# Patient Record
Sex: MALE | Race: BLACK OR AFRICAN AMERICAN | NOT HISPANIC OR LATINO | Employment: FULL TIME | URBAN - METROPOLITAN AREA
[De-identification: names, ages, dates, MRNs, and addresses within clinical notes are randomized per-mention and may not be internally consistent; named-entity substitution may affect disease eponyms.]

---

## 2021-03-19 ENCOUNTER — HOSPITAL ENCOUNTER (OUTPATIENT)
Facility: HOSPITAL | Age: 51
Discharge: HOME | End: 2021-03-20
Attending: EMERGENCY MEDICINE | Admitting: INTERNAL MEDICINE
Payer: MEDICAID

## 2021-03-19 DIAGNOSIS — R79.89 TROPONIN I ABOVE REFERENCE RANGE: ICD-10-CM

## 2021-03-19 DIAGNOSIS — R94.31 EKG, ABNORMAL: ICD-10-CM

## 2021-03-19 DIAGNOSIS — R79.89 ELEVATED TROPONIN: ICD-10-CM

## 2021-03-19 DIAGNOSIS — R07.89 ATYPICAL CHEST PAIN: ICD-10-CM

## 2021-03-19 DIAGNOSIS — R07.9 CHEST PAIN: ICD-10-CM

## 2021-03-19 DIAGNOSIS — I24.9 ACUTE CORONARY SYNDROME: Primary | ICD-10-CM

## 2021-03-19 PROBLEM — Z21 ASYMPTOMATIC HIV INFECTION: Status: ACTIVE | Noted: 2021-03-19

## 2021-03-19 PROBLEM — Z01.810 PRE-OPERATIVE CARDIOVASCULAR EXAMINATION, NEW EKG ABNORMALITIES C/W ISCHEMIA: Status: ACTIVE | Noted: 2021-03-19

## 2021-03-19 LAB
ALBUMIN SERPL BCP-MCNC: 3.8 G/DL (ref 3.5–5.2)
ALP SERPL-CCNC: 62 U/L (ref 55–135)
ALT SERPL W/O P-5'-P-CCNC: 16 U/L (ref 10–44)
ANION GAP SERPL CALC-SCNC: 6 MMOL/L (ref 8–16)
AORTIC ROOT ANNULUS: 3.01 CM
APTT BLDCRRT: 25.1 SEC (ref 21–32)
APTT BLDCRRT: 37.7 SEC (ref 21–32)
ASCENDING AORTA: 3.22 CM
AST SERPL-CCNC: 42 U/L (ref 10–40)
AV INDEX (PROSTH): 1.11
AV MEAN GRADIENT: 4 MMHG
AV PEAK GRADIENT: 7 MMHG
AV VALVE AREA: 3.52 CM2
AV VELOCITY RATIO: 0.95
BASOPHILS # BLD AUTO: 0.03 K/UL (ref 0–0.2)
BASOPHILS # BLD AUTO: 0.04 K/UL (ref 0–0.2)
BASOPHILS NFR BLD: 0.4 % (ref 0–1.9)
BASOPHILS NFR BLD: 0.4 % (ref 0–1.9)
BILIRUB SERPL-MCNC: 0.3 MG/DL (ref 0.1–1)
BNP SERPL-MCNC: <10 PG/ML (ref 0–99)
BSA FOR ECHO PROCEDURE: 2.09 M2
BUN SERPL-MCNC: 8 MG/DL (ref 6–20)
CALCIUM SERPL-MCNC: 8.8 MG/DL (ref 8.7–10.5)
CHLORIDE SERPL-SCNC: 111 MMOL/L (ref 95–110)
CO2 SERPL-SCNC: 22 MMOL/L (ref 23–29)
CREAT SERPL-MCNC: 1.1 MG/DL (ref 0.5–1.4)
CTP QC/QA: YES
CV ECHO LV RWT: 0.6 CM
DIFFERENTIAL METHOD: ABNORMAL
DIFFERENTIAL METHOD: ABNORMAL
DOP CALC AO PEAK VEL: 1.31 M/S
DOP CALC AO VTI: 24.18 CM
DOP CALC LVOT AREA: 3.2 CM2
DOP CALC LVOT DIAMETER: 2.01 CM
DOP CALC LVOT PEAK VEL: 1.25 M/S
DOP CALC LVOT STROKE VOLUME: 85.22 CM3
DOP CALC RVOT PEAK VEL: 0.82 M/S
DOP CALC RVOT VTI: 19.38 CM
DOP CALCLVOT PEAK VEL VTI: 26.87 CM
E WAVE DECELERATION TIME: 214.67 MSEC
E/A RATIO: 0.84
E/E' RATIO: 8.1 M/S
ECHO LV POSTERIOR WALL: 1.2 CM (ref 0.6–1.1)
EOSINOPHIL # BLD AUTO: 0.1 K/UL (ref 0–0.5)
EOSINOPHIL # BLD AUTO: 0.2 K/UL (ref 0–0.5)
EOSINOPHIL NFR BLD: 1.4 % (ref 0–8)
EOSINOPHIL NFR BLD: 2 % (ref 0–8)
ERYTHROCYTE [DISTWIDTH] IN BLOOD BY AUTOMATED COUNT: 13 % (ref 11.5–14.5)
ERYTHROCYTE [DISTWIDTH] IN BLOOD BY AUTOMATED COUNT: 13 % (ref 11.5–14.5)
EST. GFR  (AFRICAN AMERICAN): >60 ML/MIN/1.73 M^2
EST. GFR  (NON AFRICAN AMERICAN): >60 ML/MIN/1.73 M^2
FRACTIONAL SHORTENING: 32 % (ref 28–44)
GLUCOSE SERPL-MCNC: 96 MG/DL (ref 70–110)
HCT VFR BLD AUTO: 34.6 % (ref 40–54)
HCT VFR BLD AUTO: 35.9 % (ref 40–54)
HCV AB SERPL QL IA: NEGATIVE
HGB BLD-MCNC: 11.6 G/DL (ref 14–18)
HGB BLD-MCNC: 12.3 G/DL (ref 14–18)
IMM GRANULOCYTES # BLD AUTO: 0.02 K/UL (ref 0–0.04)
IMM GRANULOCYTES # BLD AUTO: 0.03 K/UL (ref 0–0.04)
IMM GRANULOCYTES NFR BLD AUTO: 0.2 % (ref 0–0.5)
IMM GRANULOCYTES NFR BLD AUTO: 0.3 % (ref 0–0.5)
INR PPP: 0.9 (ref 0.8–1.2)
INTERVENTRICULAR SEPTUM: 1.4 CM (ref 0.6–1.1)
IVRT: 91.34 MSEC
LA MAJOR: 4.37 CM
LA MINOR: 3.7 CM
LEFT ATRIUM SIZE: 3.32 CM
LEFT INTERNAL DIMENSION IN SYSTOLE: 2.73 CM (ref 2.1–4)
LEFT VENTRICLE DIASTOLIC VOLUME INDEX: 34.57 ML/M2
LEFT VENTRICLE DIASTOLIC VOLUME: 71.21 ML
LEFT VENTRICLE MASS INDEX: 92 G/M2
LEFT VENTRICLE SYSTOLIC VOLUME INDEX: 13.5 ML/M2
LEFT VENTRICLE SYSTOLIC VOLUME: 27.76 ML
LEFT VENTRICULAR INTERNAL DIMENSION IN DIASTOLE: 4.03 CM (ref 3.5–6)
LEFT VENTRICULAR MASS: 188.62 G
LIPASE SERPL-CCNC: 74 U/L (ref 4–60)
LV LATERAL E/E' RATIO: 7.36 M/S
LV SEPTAL E/E' RATIO: 9 M/S
LYMPHOCYTES # BLD AUTO: 3 K/UL (ref 1–4.8)
LYMPHOCYTES # BLD AUTO: 3.1 K/UL (ref 1–4.8)
LYMPHOCYTES NFR BLD: 33.7 % (ref 18–48)
LYMPHOCYTES NFR BLD: 37 % (ref 18–48)
MCH RBC QN AUTO: 36.6 PG (ref 27–31)
MCH RBC QN AUTO: 36.9 PG (ref 27–31)
MCHC RBC AUTO-ENTMCNC: 33.5 G/DL (ref 32–36)
MCHC RBC AUTO-ENTMCNC: 34.3 G/DL (ref 32–36)
MCV RBC AUTO: 108 FL (ref 82–98)
MCV RBC AUTO: 109 FL (ref 82–98)
MONOCYTES # BLD AUTO: 0.5 K/UL (ref 0.3–1)
MONOCYTES # BLD AUTO: 0.6 K/UL (ref 0.3–1)
MONOCYTES NFR BLD: 6.3 % (ref 4–15)
MONOCYTES NFR BLD: 6.7 % (ref 4–15)
MV PEAK A VEL: 0.97 M/S
MV PEAK E VEL: 0.81 M/S
MV STENOSIS PRESSURE HALF TIME: 62.25 MS
MV VALVE AREA P 1/2 METHOD: 3.53 CM2
NEUTROPHILS # BLD AUTO: 4.4 K/UL (ref 1.8–7.7)
NEUTROPHILS # BLD AUTO: 5.2 K/UL (ref 1.8–7.7)
NEUTROPHILS NFR BLD: 54.1 % (ref 38–73)
NEUTROPHILS NFR BLD: 57.5 % (ref 38–73)
NRBC BLD-RTO: 0 /100 WBC
NRBC BLD-RTO: 0 /100 WBC
PISA MRMAX VEL: 0.05 M/S
PLATELET # BLD AUTO: 286 K/UL (ref 150–350)
PLATELET # BLD AUTO: 304 K/UL (ref 150–350)
PMV BLD AUTO: 9.4 FL (ref 9.2–12.9)
PMV BLD AUTO: 9.5 FL (ref 9.2–12.9)
POCT GLUCOSE: 89 MG/DL (ref 70–110)
POTASSIUM SERPL-SCNC: 5 MMOL/L (ref 3.5–5.1)
PROT SERPL-MCNC: 7.5 G/DL (ref 6–8.4)
PROTHROMBIN TIME: 10.2 SEC (ref 9–12.5)
PV MEAN GRADIENT: 1 MMHG
RA MAJOR: 3.63 CM
RA PRESSURE: 3 MMHG
RBC # BLD AUTO: 3.17 M/UL (ref 4.6–6.2)
RBC # BLD AUTO: 3.33 M/UL (ref 4.6–6.2)
SARS-COV-2 RDRP RESP QL NAA+PROBE: NEGATIVE
SINUS: 3.31 CM
SODIUM SERPL-SCNC: 139 MMOL/L (ref 136–145)
STJ: 3.19 CM
TDI LATERAL: 0.11 M/S
TDI SEPTAL: 0.09 M/S
TDI: 0.1 M/S
TRICUSPID ANNULAR PLANE SYSTOLIC EXCURSION: 1.93 CM
TROPONIN I SERPL DL<=0.01 NG/ML-MCNC: 0.08 NG/ML (ref 0–0.03)
WBC # BLD AUTO: 8.09 K/UL (ref 3.9–12.7)
WBC # BLD AUTO: 9.04 K/UL (ref 3.9–12.7)

## 2021-03-19 PROCEDURE — 96366 THER/PROPH/DIAG IV INF ADDON: CPT

## 2021-03-19 PROCEDURE — 85025 COMPLETE CBC W/AUTO DIFF WBC: CPT | Performed by: EMERGENCY MEDICINE

## 2021-03-19 PROCEDURE — 80053 COMPREHEN METABOLIC PANEL: CPT | Performed by: EMERGENCY MEDICINE

## 2021-03-19 PROCEDURE — 96374 THER/PROPH/DIAG INJ IV PUSH: CPT

## 2021-03-19 PROCEDURE — 63600175 PHARM REV CODE 636 W HCPCS: Performed by: EMERGENCY MEDICINE

## 2021-03-19 PROCEDURE — 25000003 PHARM REV CODE 250: Performed by: EMERGENCY MEDICINE

## 2021-03-19 PROCEDURE — 85610 PROTHROMBIN TIME: CPT | Performed by: EMERGENCY MEDICINE

## 2021-03-19 PROCEDURE — 83690 ASSAY OF LIPASE: CPT | Performed by: EMERGENCY MEDICINE

## 2021-03-19 PROCEDURE — 84484 ASSAY OF TROPONIN QUANT: CPT | Performed by: EMERGENCY MEDICINE

## 2021-03-19 PROCEDURE — 99220 PR INITIAL OBSERVATION CARE,LEVL III: ICD-10-PCS | Mod: 25,,, | Performed by: INTERNAL MEDICINE

## 2021-03-19 PROCEDURE — 96376 TX/PRO/DX INJ SAME DRUG ADON: CPT

## 2021-03-19 PROCEDURE — 96365 THER/PROPH/DIAG IV INF INIT: CPT

## 2021-03-19 PROCEDURE — 96375 TX/PRO/DX INJ NEW DRUG ADDON: CPT | Mod: 59

## 2021-03-19 PROCEDURE — G0378 HOSPITAL OBSERVATION PER HR: HCPCS

## 2021-03-19 PROCEDURE — U0002 COVID-19 LAB TEST NON-CDC: HCPCS | Performed by: EMERGENCY MEDICINE

## 2021-03-19 PROCEDURE — 25000003 PHARM REV CODE 250: Performed by: PHYSICIAN ASSISTANT

## 2021-03-19 PROCEDURE — 93005 ELECTROCARDIOGRAM TRACING: CPT

## 2021-03-19 PROCEDURE — 36415 COLL VENOUS BLD VENIPUNCTURE: CPT | Performed by: EMERGENCY MEDICINE

## 2021-03-19 PROCEDURE — 84484 ASSAY OF TROPONIN QUANT: CPT | Mod: 91 | Performed by: NURSE PRACTITIONER

## 2021-03-19 PROCEDURE — 83880 ASSAY OF NATRIURETIC PEPTIDE: CPT | Performed by: EMERGENCY MEDICINE

## 2021-03-19 PROCEDURE — 99220 PR INITIAL OBSERVATION CARE,LEVL III: CPT | Mod: 25,,, | Performed by: INTERNAL MEDICINE

## 2021-03-19 PROCEDURE — 36415 COLL VENOUS BLD VENIPUNCTURE: CPT | Performed by: NURSE PRACTITIONER

## 2021-03-19 PROCEDURE — 93010 ELECTROCARDIOGRAM REPORT: CPT | Mod: ,,, | Performed by: INTERNAL MEDICINE

## 2021-03-19 PROCEDURE — 86803 HEPATITIS C AB TEST: CPT | Performed by: EMERGENCY MEDICINE

## 2021-03-19 PROCEDURE — 93010 EKG 12-LEAD: ICD-10-PCS | Mod: ,,, | Performed by: INTERNAL MEDICINE

## 2021-03-19 PROCEDURE — 96360 HYDRATION IV INFUSION INIT: CPT | Mod: 59

## 2021-03-19 PROCEDURE — 99291 CRITICAL CARE FIRST HOUR: CPT | Mod: 25

## 2021-03-19 PROCEDURE — 25000003 PHARM REV CODE 250: Performed by: NURSE PRACTITIONER

## 2021-03-19 PROCEDURE — 85730 THROMBOPLASTIN TIME PARTIAL: CPT | Performed by: EMERGENCY MEDICINE

## 2021-03-19 PROCEDURE — 96361 HYDRATE IV INFUSION ADD-ON: CPT

## 2021-03-19 RX ORDER — IBUPROFEN 800 MG/1
800 TABLET ORAL DAILY PRN
COMMUNITY
End: 2022-03-11

## 2021-03-19 RX ORDER — METOPROLOL TARTRATE 25 MG/1
25 TABLET, FILM COATED ORAL
Status: COMPLETED | OUTPATIENT
Start: 2021-03-19 | End: 2021-03-19

## 2021-03-19 RX ORDER — BENZONATATE 100 MG/1
100 CAPSULE ORAL 3 TIMES DAILY PRN
COMMUNITY
Start: 2020-12-28 | End: 2021-03-19 | Stop reason: CLARIF

## 2021-03-19 RX ORDER — EFAVIRENZ, EMTRICITABINE AND TENOFOVIR DISOPROXIL FUMARATE 600; 200; 300 MG/1; MG/1; MG/1
1 TABLET, FILM COATED ORAL DAILY
Status: DISCONTINUED | OUTPATIENT
Start: 2021-03-19 | End: 2021-03-19

## 2021-03-19 RX ORDER — BICTEGRAVIR SODIUM, EMTRICITABINE, AND TENOFOVIR ALAFENAMIDE FUMARATE 50; 200; 25 MG/1; MG/1; MG/1
1 TABLET ORAL DAILY
COMMUNITY

## 2021-03-19 RX ORDER — KETOROLAC TROMETHAMINE 30 MG/ML
15 INJECTION, SOLUTION INTRAMUSCULAR; INTRAVENOUS
Status: COMPLETED | OUTPATIENT
Start: 2021-03-19 | End: 2021-03-19

## 2021-03-19 RX ORDER — ASPIRIN 325 MG
325 TABLET ORAL
Status: COMPLETED | OUTPATIENT
Start: 2021-03-19 | End: 2021-03-19

## 2021-03-19 RX ORDER — METOPROLOL TARTRATE 25 MG/1
25 TABLET, FILM COATED ORAL 2 TIMES DAILY
Status: DISCONTINUED | OUTPATIENT
Start: 2021-03-19 | End: 2021-03-20 | Stop reason: HOSPADM

## 2021-03-19 RX ORDER — EFAVIRENZ, EMTRICITABINE AND TENOFOVIR DISOPROXIL FUMARATE 600; 200; 300 MG/1; MG/1; MG/1
1 TABLET, FILM COATED ORAL
COMMUNITY
End: 2021-03-19

## 2021-03-19 RX ORDER — SODIUM CHLORIDE 9 MG/ML
INJECTION, SOLUTION INTRAVENOUS CONTINUOUS
Status: DISCONTINUED | OUTPATIENT
Start: 2021-03-19 | End: 2021-03-20 | Stop reason: HOSPADM

## 2021-03-19 RX ORDER — ASPIRIN 81 MG/1
81 TABLET ORAL DAILY
Status: DISCONTINUED | OUTPATIENT
Start: 2021-03-19 | End: 2021-03-20 | Stop reason: HOSPADM

## 2021-03-19 RX ORDER — ONDANSETRON 2 MG/ML
4 INJECTION INTRAMUSCULAR; INTRAVENOUS
Status: COMPLETED | OUTPATIENT
Start: 2021-03-19 | End: 2021-03-19

## 2021-03-19 RX ORDER — HEPARIN SODIUM,PORCINE/D5W 25000/250
0-40 INTRAVENOUS SOLUTION INTRAVENOUS CONTINUOUS
Status: DISCONTINUED | OUTPATIENT
Start: 2021-03-19 | End: 2021-03-20 | Stop reason: HOSPADM

## 2021-03-19 RX ADMIN — METOPROLOL TARTRATE 25 MG: 25 TABLET, FILM COATED ORAL at 09:03

## 2021-03-19 RX ADMIN — NITROGLYCERIN 1 INCH: 20 OINTMENT TOPICAL at 02:03

## 2021-03-19 RX ADMIN — SODIUM CHLORIDE: 0.9 INJECTION, SOLUTION INTRAVENOUS at 03:03

## 2021-03-19 RX ADMIN — ONDANSETRON 4 MG: 2 INJECTION INTRAMUSCULAR; INTRAVENOUS at 12:03

## 2021-03-19 RX ADMIN — ASPIRIN 325 MG ORAL TABLET 325 MG: 325 PILL ORAL at 02:03

## 2021-03-19 RX ADMIN — KETOROLAC TROMETHAMINE 15 MG: 30 INJECTION, SOLUTION INTRAMUSCULAR at 12:03

## 2021-03-19 RX ADMIN — METOPROLOL TARTRATE 25 MG: 25 TABLET, FILM COATED ORAL at 02:03

## 2021-03-19 RX ADMIN — HEPARIN SODIUM 12 UNITS/KG/HR: 10000 INJECTION, SOLUTION INTRAVENOUS at 03:03

## 2021-03-20 VITALS
OXYGEN SATURATION: 98 % | BODY MASS INDEX: 28.2 KG/M2 | WEIGHT: 197 LBS | SYSTOLIC BLOOD PRESSURE: 110 MMHG | HEART RATE: 69 BPM | RESPIRATION RATE: 18 BRPM | HEIGHT: 70 IN | TEMPERATURE: 98 F | DIASTOLIC BLOOD PRESSURE: 68 MMHG

## 2021-03-20 PROBLEM — R79.89 ELEVATED TROPONIN: Status: RESOLVED | Noted: 2021-03-19 | Resolved: 2021-03-20

## 2021-03-20 PROBLEM — R07.89 ATYPICAL CHEST PAIN: Status: RESOLVED | Noted: 2021-03-19 | Resolved: 2021-03-20

## 2021-03-20 LAB
ALBUMIN SERPL BCP-MCNC: 3.3 G/DL (ref 3.5–5.2)
ALP SERPL-CCNC: 54 U/L (ref 55–135)
ALT SERPL W/O P-5'-P-CCNC: 11 U/L (ref 10–44)
ANION GAP SERPL CALC-SCNC: 8 MMOL/L (ref 8–16)
APTT BLDCRRT: 47 SEC (ref 21–32)
APTT BLDCRRT: 50.3 SEC (ref 21–32)
AST SERPL-CCNC: 17 U/L (ref 10–40)
BASOPHILS # BLD AUTO: 0.05 K/UL (ref 0–0.2)
BASOPHILS NFR BLD: 0.6 % (ref 0–1.9)
BILIRUB SERPL-MCNC: 0.2 MG/DL (ref 0.1–1)
BUN SERPL-MCNC: 8 MG/DL (ref 6–20)
CALCIUM SERPL-MCNC: 8 MG/DL (ref 8.7–10.5)
CHLORIDE SERPL-SCNC: 112 MMOL/L (ref 95–110)
CHOLEST SERPL-MCNC: 150 MG/DL (ref 120–199)
CHOLEST/HDLC SERPL: 3.6 {RATIO} (ref 2–5)
CO2 SERPL-SCNC: 21 MMOL/L (ref 23–29)
CREAT SERPL-MCNC: 1 MG/DL (ref 0.5–1.4)
DIFFERENTIAL METHOD: ABNORMAL
EOSINOPHIL # BLD AUTO: 0.2 K/UL (ref 0–0.5)
EOSINOPHIL NFR BLD: 2 % (ref 0–8)
ERYTHROCYTE [DISTWIDTH] IN BLOOD BY AUTOMATED COUNT: 12.8 % (ref 11.5–14.5)
EST. GFR  (AFRICAN AMERICAN): >60 ML/MIN/1.73 M^2
EST. GFR  (NON AFRICAN AMERICAN): >60 ML/MIN/1.73 M^2
GLUCOSE SERPL-MCNC: 87 MG/DL (ref 70–110)
HCT VFR BLD AUTO: 33.8 % (ref 40–54)
HDLC SERPL-MCNC: 42 MG/DL (ref 40–75)
HDLC SERPL: 28 % (ref 20–50)
HGB BLD-MCNC: 11.2 G/DL (ref 14–18)
IMM GRANULOCYTES # BLD AUTO: 0.03 K/UL (ref 0–0.04)
IMM GRANULOCYTES NFR BLD AUTO: 0.4 % (ref 0–0.5)
LDLC SERPL CALC-MCNC: 92.8 MG/DL (ref 63–159)
LYMPHOCYTES # BLD AUTO: 3.6 K/UL (ref 1–4.8)
LYMPHOCYTES NFR BLD: 45.8 % (ref 18–48)
MCH RBC QN AUTO: 36.5 PG (ref 27–31)
MCHC RBC AUTO-ENTMCNC: 33.1 G/DL (ref 32–36)
MCV RBC AUTO: 110 FL (ref 82–98)
MONOCYTES # BLD AUTO: 0.5 K/UL (ref 0.3–1)
MONOCYTES NFR BLD: 6.1 % (ref 4–15)
NEUTROPHILS # BLD AUTO: 3.5 K/UL (ref 1.8–7.7)
NEUTROPHILS NFR BLD: 45.1 % (ref 38–73)
NONHDLC SERPL-MCNC: 108 MG/DL
NRBC BLD-RTO: 0 /100 WBC
PLATELET # BLD AUTO: 295 K/UL (ref 150–350)
PLATELET BLD QL SMEAR: ABNORMAL
PMV BLD AUTO: 9.8 FL (ref 9.2–12.9)
POTASSIUM SERPL-SCNC: 3.8 MMOL/L (ref 3.5–5.1)
PROT SERPL-MCNC: 6 G/DL (ref 6–8.4)
RBC # BLD AUTO: 3.07 M/UL (ref 4.6–6.2)
SODIUM SERPL-SCNC: 141 MMOL/L (ref 136–145)
TRIGL SERPL-MCNC: 76 MG/DL (ref 30–150)
TROPONIN I SERPL DL<=0.01 NG/ML-MCNC: 0.06 NG/ML (ref 0–0.03)
WBC # BLD AUTO: 7.84 K/UL (ref 3.9–12.7)

## 2021-03-20 PROCEDURE — 25000003 PHARM REV CODE 250: Performed by: PHYSICIAN ASSISTANT

## 2021-03-20 PROCEDURE — 85730 THROMBOPLASTIN TIME PARTIAL: CPT | Performed by: EMERGENCY MEDICINE

## 2021-03-20 PROCEDURE — 85730 THROMBOPLASTIN TIME PARTIAL: CPT | Mod: 91 | Performed by: INTERNAL MEDICINE

## 2021-03-20 PROCEDURE — 25000003 PHARM REV CODE 250: Performed by: NURSE PRACTITIONER

## 2021-03-20 PROCEDURE — 36415 COLL VENOUS BLD VENIPUNCTURE: CPT | Performed by: INTERNAL MEDICINE

## 2021-03-20 PROCEDURE — 63600175 PHARM REV CODE 636 W HCPCS: Performed by: PHYSICIAN ASSISTANT

## 2021-03-20 PROCEDURE — 80061 LIPID PANEL: CPT | Performed by: NURSE PRACTITIONER

## 2021-03-20 PROCEDURE — 63600175 PHARM REV CODE 636 W HCPCS: Performed by: EMERGENCY MEDICINE

## 2021-03-20 PROCEDURE — 84484 ASSAY OF TROPONIN QUANT: CPT | Performed by: NURSE PRACTITIONER

## 2021-03-20 PROCEDURE — 96376 TX/PRO/DX INJ SAME DRUG ADON: CPT

## 2021-03-20 PROCEDURE — G0378 HOSPITAL OBSERVATION PER HR: HCPCS

## 2021-03-20 PROCEDURE — 36415 COLL VENOUS BLD VENIPUNCTURE: CPT | Performed by: NURSE PRACTITIONER

## 2021-03-20 PROCEDURE — 96366 THER/PROPH/DIAG IV INF ADDON: CPT

## 2021-03-20 PROCEDURE — 85025 COMPLETE CBC W/AUTO DIFF WBC: CPT | Performed by: EMERGENCY MEDICINE

## 2021-03-20 PROCEDURE — 80053 COMPREHEN METABOLIC PANEL: CPT | Performed by: NURSE PRACTITIONER

## 2021-03-20 PROCEDURE — 99225 PR SUBSEQUENT OBSERVATION CARE,LEVEL II: ICD-10-PCS | Mod: ,,, | Performed by: INTERNAL MEDICINE

## 2021-03-20 PROCEDURE — 96361 HYDRATE IV INFUSION ADD-ON: CPT

## 2021-03-20 PROCEDURE — 99225 PR SUBSEQUENT OBSERVATION CARE,LEVEL II: CPT | Mod: ,,, | Performed by: INTERNAL MEDICINE

## 2021-03-20 RX ORDER — REGADENOSON 0.08 MG/ML
0.4 INJECTION, SOLUTION INTRAVENOUS ONCE
Status: COMPLETED | OUTPATIENT
Start: 2021-03-20 | End: 2021-03-20

## 2021-03-20 RX ORDER — ASPIRIN 81 MG/1
81 TABLET ORAL DAILY
Qty: 30 TABLET | Refills: 11 | Status: SHIPPED | OUTPATIENT
Start: 2021-03-21 | End: 2022-03-21

## 2021-03-20 RX ORDER — SIMVASTATIN 5 MG/1
5 TABLET, FILM COATED ORAL NIGHTLY
Qty: 90 TABLET | Refills: 3 | Status: SHIPPED | OUTPATIENT
Start: 2021-03-20 | End: 2021-04-07

## 2021-03-20 RX ORDER — METOPROLOL TARTRATE 25 MG/1
25 TABLET, FILM COATED ORAL 2 TIMES DAILY
Qty: 60 TABLET | Refills: 11 | Status: SHIPPED | OUTPATIENT
Start: 2021-03-20 | End: 2021-04-07

## 2021-03-20 RX ADMIN — NITROGLYCERIN 1 INCH: 20 OINTMENT TOPICAL at 05:03

## 2021-03-20 RX ADMIN — NITROGLYCERIN 1 INCH: 20 OINTMENT TOPICAL at 11:03

## 2021-03-20 RX ADMIN — HEPARIN SODIUM 14 UNITS/KG/HR: 10000 INJECTION, SOLUTION INTRAVENOUS at 09:03

## 2021-03-20 RX ADMIN — SODIUM CHLORIDE: 0.9 INJECTION, SOLUTION INTRAVENOUS at 09:03

## 2021-03-20 RX ADMIN — ASPIRIN 81 MG: 81 TABLET, FILM COATED ORAL at 08:03

## 2021-03-20 RX ADMIN — REGADENOSON 0.4 MG: 0.08 INJECTION, SOLUTION INTRAVENOUS at 03:03

## 2021-03-20 RX ADMIN — METOPROLOL TARTRATE 25 MG: 25 TABLET, FILM COATED ORAL at 08:03

## 2021-03-21 LAB
CV STRESS BASE HR: 53 BPM
DIASTOLIC BLOOD PRESSURE: 65 MMHG
EJECTION FRACTION- HIGH: 65 %
END DIASTOLIC INDEX-HIGH: 158 ML/M2
END DIASTOLIC INDEX-LOW: 94 ML/M2
END SYSTOLIC INDEX-HIGH: 71 ML/M2
END SYSTOLIC INDEX-LOW: 33 ML/M2
NUC REST DIASTOLIC VOLUME INDEX: 117
NUC REST EJECTION FRACTION: 44
NUC REST SYSTOLIC VOLUME INDEX: 66
NUC STRESS DIASTOLIC VOLUME INDEX: 123
NUC STRESS EJECTION FRACTION: 62 %
NUC STRESS SYSTOLIC VOLUME INDEX: 47
OHS CV CPX 85 PERCENT MAX PREDICTED HEART RATE MALE: 144
OHS CV CPX MAX PREDICTED HEART RATE: 169
OHS CV CPX PATIENT IS FEMALE: 0
OHS CV CPX PATIENT IS MALE: 1
OHS CV CPX PEAK DIASTOLIC BLOOD PRESSURE: 74 MMHG
OHS CV CPX PEAK HEAR RATE: 92 BPM
OHS CV CPX PEAK RATE PRESSURE PRODUCT: NORMAL
OHS CV CPX PEAK SYSTOLIC BLOOD PRESSURE: 114 MMHG
OHS CV CPX PERCENT MAX PREDICTED HEART RATE ACHIEVED: 54
OHS CV CPX RATE PRESSURE PRODUCT PRESENTING: 5830
RETIRED EF AND QEF - SEE NOTES: 53 %
SYSTOLIC BLOOD PRESSURE: 110 MMHG

## 2021-03-26 ENCOUNTER — TELEPHONE (OUTPATIENT)
Dept: CARDIOLOGY | Facility: CLINIC | Age: 51
End: 2021-03-26

## 2021-03-29 ENCOUNTER — TELEPHONE (OUTPATIENT)
Dept: CARDIOLOGY | Facility: CLINIC | Age: 51
End: 2021-03-29

## 2021-04-07 ENCOUNTER — OFFICE VISIT (OUTPATIENT)
Dept: CARDIOLOGY | Facility: CLINIC | Age: 51
End: 2021-04-07
Payer: MEDICAID

## 2021-04-07 VITALS
HEART RATE: 88 BPM | WEIGHT: 198.19 LBS | DIASTOLIC BLOOD PRESSURE: 70 MMHG | OXYGEN SATURATION: 95 % | BODY MASS INDEX: 29.36 KG/M2 | HEIGHT: 69 IN | SYSTOLIC BLOOD PRESSURE: 124 MMHG

## 2021-04-07 DIAGNOSIS — R07.89 ATYPICAL CHEST PAIN: Primary | ICD-10-CM

## 2021-04-07 PROCEDURE — 99999 PR PBB SHADOW E&M-EST. PATIENT-LVL III: CPT | Mod: PBBFAC,,, | Performed by: NURSE PRACTITIONER

## 2021-04-07 PROCEDURE — 99213 PR OFFICE/OUTPT VISIT, EST, LEVL III, 20-29 MIN: ICD-10-PCS | Mod: S$PBB,,, | Performed by: NURSE PRACTITIONER

## 2021-04-07 PROCEDURE — 99999 PR PBB SHADOW E&M-EST. PATIENT-LVL III: ICD-10-PCS | Mod: PBBFAC,,, | Performed by: NURSE PRACTITIONER

## 2021-04-07 PROCEDURE — 99213 OFFICE O/P EST LOW 20 MIN: CPT | Mod: PBBFAC | Performed by: NURSE PRACTITIONER

## 2021-04-07 PROCEDURE — 99213 OFFICE O/P EST LOW 20 MIN: CPT | Mod: S$PBB,,, | Performed by: NURSE PRACTITIONER

## 2021-06-30 ENCOUNTER — OFFICE VISIT (OUTPATIENT)
Dept: PRIMARY CARE CLINIC | Facility: CLINIC | Age: 51
End: 2021-06-30
Payer: MEDICAID

## 2021-06-30 DIAGNOSIS — Z20.2 EXPOSURE TO STD: Primary | ICD-10-CM

## 2021-06-30 DIAGNOSIS — Z20.2 EXPOSURE TO SYPHILIS: ICD-10-CM

## 2021-06-30 PROCEDURE — 99203 OFFICE O/P NEW LOW 30 MIN: CPT | Mod: S$PBB,,, | Performed by: NURSE PRACTITIONER

## 2021-06-30 PROCEDURE — 99203 PR OFFICE/OUTPT VISIT, NEW, LEVL III, 30-44 MIN: ICD-10-PCS | Mod: S$PBB,,, | Performed by: NURSE PRACTITIONER

## 2021-07-06 DIAGNOSIS — Z20.2 EXPOSURE TO SYPHILIS: Primary | ICD-10-CM

## 2021-07-06 PROCEDURE — 99211 OFF/OP EST MAY X REQ PHY/QHP: CPT | Mod: ,,, | Performed by: NURSE PRACTITIONER

## 2021-07-06 PROCEDURE — 99211 PR OFFICE/OUTPT VISIT, EST, LEVL I: ICD-10-PCS | Mod: ,,, | Performed by: NURSE PRACTITIONER

## 2021-07-13 DIAGNOSIS — Z20.2 EXPOSURE TO SYPHILIS: Primary | ICD-10-CM

## 2021-07-13 PROCEDURE — 99211 PR OFFICE/OUTPT VISIT, EST, LEVL I: ICD-10-PCS | Mod: ,,, | Performed by: NURSE PRACTITIONER

## 2021-07-13 PROCEDURE — 99211 OFF/OP EST MAY X REQ PHY/QHP: CPT | Mod: ,,, | Performed by: NURSE PRACTITIONER

## 2021-08-19 ENCOUNTER — OCCUPATIONAL HEALTH (OUTPATIENT)
Dept: URGENT CARE | Facility: CLINIC | Age: 51
End: 2021-08-19

## 2021-08-19 DIAGNOSIS — R05.9 COUGH: ICD-10-CM

## 2021-08-19 DIAGNOSIS — R05.9 COUGH: Primary | ICD-10-CM

## 2021-08-19 DIAGNOSIS — R51.9 NONINTRACTABLE HEADACHE, UNSPECIFIED CHRONICITY PATTERN, UNSPECIFIED HEADACHE TYPE: ICD-10-CM

## 2021-08-19 LAB
CTP QC/QA: YES
SARS-COV-2 RDRP RESP QL NAA+PROBE: NEGATIVE

## 2021-08-19 PROCEDURE — U0002 COVID-19 LAB TEST NON-CDC: HCPCS | Mod: QW,S$GLB,, | Performed by: PREVENTIVE MEDICINE

## 2021-08-19 PROCEDURE — U0002: ICD-10-PCS | Mod: QW,S$GLB,, | Performed by: PREVENTIVE MEDICINE

## 2022-03-01 ENCOUNTER — IMMUNIZATION (OUTPATIENT)
Dept: PRIMARY CARE CLINIC | Facility: CLINIC | Age: 52
End: 2022-03-01
Payer: MEDICAID

## 2022-03-01 DIAGNOSIS — Z23 NEED FOR VACCINATION: Primary | ICD-10-CM

## 2022-03-01 PROCEDURE — 91300 COVID-19, MRNA, LNP-S, PF, 30 MCG/0.3 ML DOSE VACCINE: CPT | Mod: PBBFAC | Performed by: FAMILY MEDICINE

## 2022-03-02 DIAGNOSIS — R11.0 NAUSEA: Primary | ICD-10-CM

## 2022-03-02 DIAGNOSIS — T50.B95A MYALGIA AFTER COVID-19 VACCINATION: ICD-10-CM

## 2022-03-02 DIAGNOSIS — M79.10 MYALGIA: ICD-10-CM

## 2022-03-02 DIAGNOSIS — M79.10 MYALGIA AFTER COVID-19 VACCINATION: ICD-10-CM

## 2022-03-02 RX ORDER — ONDANSETRON 4 MG/1
4 TABLET, ORALLY DISINTEGRATING ORAL
Status: DISCONTINUED | OUTPATIENT
Start: 2022-03-02 | End: 2022-08-22

## 2022-03-02 RX ORDER — IBUPROFEN 200 MG
600 TABLET ORAL
Status: DISCONTINUED | OUTPATIENT
Start: 2022-03-02 | End: 2022-08-22

## 2022-03-11 DIAGNOSIS — M54.9 BACK PAIN, UNSPECIFIED BACK LOCATION, UNSPECIFIED BACK PAIN LATERALITY, UNSPECIFIED CHRONICITY: Primary | ICD-10-CM

## 2022-03-11 RX ORDER — TIZANIDINE 4 MG/1
4 TABLET ORAL EVERY 8 HOURS PRN
Qty: 30 TABLET | Refills: 1 | Status: SHIPPED | OUTPATIENT
Start: 2022-03-11 | End: 2022-03-21

## 2022-03-11 RX ORDER — DICLOFENAC SODIUM 75 MG/1
75 TABLET, DELAYED RELEASE ORAL 2 TIMES DAILY PRN
Qty: 30 TABLET | Refills: 2 | Status: SHIPPED | OUTPATIENT
Start: 2022-03-11

## 2022-03-11 RX ORDER — KETOROLAC TROMETHAMINE 30 MG/ML
60 INJECTION, SOLUTION INTRAMUSCULAR; INTRAVENOUS
Status: DISCONTINUED | OUTPATIENT
Start: 2022-03-11 | End: 2022-08-22

## 2022-05-10 ENCOUNTER — HOSPITAL ENCOUNTER (OUTPATIENT)
Dept: RADIOLOGY | Facility: HOSPITAL | Age: 52
Discharge: HOME | End: 2022-05-10
Attending: NURSE PRACTITIONER
Payer: MEDICAID

## 2022-05-10 ENCOUNTER — CLINICAL SUPPORT (OUTPATIENT)
Dept: PRIMARY CARE CLINIC | Facility: CLINIC | Age: 52
End: 2022-05-10
Payer: MEDICAID

## 2022-05-10 DIAGNOSIS — R10.9 FLANK PAIN: ICD-10-CM

## 2022-05-10 DIAGNOSIS — R10.9 FLANK PAIN: Primary | ICD-10-CM

## 2022-05-10 DIAGNOSIS — N20.0 KIDNEY STONE: Primary | ICD-10-CM

## 2022-05-10 LAB
BILIRUB SERPL-MCNC: NEGATIVE MG/DL
BLOOD URINE, POC: ABNORMAL
CLARITY, POC UA: CLEAR
COLOR, POC UA: YELLOW
GLUCOSE UR QL STRIP: NEGATIVE
KETONES UR QL STRIP: NEGATIVE
LEUKOCYTE ESTERASE URINE, POC: NEGATIVE
NITRITE, POC UA: NEGATIVE
PH, POC UA: 5
PROTEIN, POC: 30
SPECIFIC GRAVITY, POC UA: 1.03
UROBILINOGEN, POC UA: NORMAL

## 2022-05-10 PROCEDURE — 74018 RADEX ABDOMEN 1 VIEW: CPT | Mod: 26,,, | Performed by: RADIOLOGY

## 2022-05-10 PROCEDURE — 99999 PR PBB SHADOW E&M-EST. PATIENT-LVL II: CPT | Mod: PBBFAC,,,

## 2022-05-10 PROCEDURE — 99999 PR PBB SHADOW E&M-EST. PATIENT-LVL II: ICD-10-PCS | Mod: PBBFAC,,,

## 2022-05-10 PROCEDURE — 74018 XR ABDOMEN AP 1 VIEW: ICD-10-PCS | Mod: 26,,, | Performed by: RADIOLOGY

## 2022-05-10 PROCEDURE — 96372 THER/PROPH/DIAG INJ SC/IM: CPT | Mod: PBBFAC,PN

## 2022-05-10 PROCEDURE — 99212 OFFICE O/P EST SF 10 MIN: CPT | Mod: PBBFAC,PN

## 2022-05-10 PROCEDURE — 81002 URINALYSIS NONAUTO W/O SCOPE: CPT | Mod: PBBFAC,PN | Performed by: NURSE PRACTITIONER

## 2022-05-10 PROCEDURE — 74018 RADEX ABDOMEN 1 VIEW: CPT | Mod: TC,PN

## 2022-05-10 RX ORDER — TAMSULOSIN HYDROCHLORIDE 0.4 MG/1
0.4 CAPSULE ORAL DAILY
Qty: 7 CAPSULE | Refills: 0 | Status: SHIPPED | OUTPATIENT
Start: 2022-05-10 | End: 2022-05-17

## 2022-05-10 RX ORDER — KETOROLAC TROMETHAMINE 30 MG/ML
60 INJECTION, SOLUTION INTRAMUSCULAR; INTRAVENOUS
Status: COMPLETED | OUTPATIENT
Start: 2022-05-10 | End: 2022-05-10

## 2022-05-10 RX ORDER — PROMETHAZINE HYDROCHLORIDE 25 MG/1
25 TABLET ORAL EVERY 6 HOURS PRN
Qty: 21 TABLET | Refills: 0 | Status: SHIPPED | OUTPATIENT
Start: 2022-05-10 | End: 2022-05-17

## 2022-05-10 RX ADMIN — KETOROLAC TROMETHAMINE 60 MG: 60 INJECTION, SOLUTION INTRAMUSCULAR at 10:05

## 2022-05-10 NOTE — PROGRESS NOTES
Patient in for POCT urine dip stick, see result notes. Provider informed. Patient will follow up with provider.

## 2022-05-11 ENCOUNTER — HOSPITAL ENCOUNTER (OUTPATIENT)
Dept: RADIOLOGY | Facility: HOSPITAL | Age: 52
Discharge: HOME | End: 2022-05-11
Attending: NURSE PRACTITIONER
Payer: MEDICAID

## 2022-05-11 ENCOUNTER — OFFICE VISIT (OUTPATIENT)
Dept: PRIMARY CARE CLINIC | Facility: CLINIC | Age: 52
End: 2022-05-11
Payer: MEDICAID

## 2022-05-11 VITALS
SYSTOLIC BLOOD PRESSURE: 135 MMHG | WEIGHT: 200 LBS | DIASTOLIC BLOOD PRESSURE: 83 MMHG | BODY MASS INDEX: 29.53 KG/M2 | OXYGEN SATURATION: 96 % | TEMPERATURE: 98 F | HEART RATE: 68 BPM

## 2022-05-11 DIAGNOSIS — R31.9 HEMATURIA, UNSPECIFIED TYPE: ICD-10-CM

## 2022-05-11 DIAGNOSIS — R10.9 ABDOMINAL PAIN, UNSPECIFIED ABDOMINAL LOCATION: ICD-10-CM

## 2022-05-11 DIAGNOSIS — R10.9 FLANK PAIN: Primary | ICD-10-CM

## 2022-05-11 DIAGNOSIS — M54.9 BACK PAIN, UNSPECIFIED BACK LOCATION, UNSPECIFIED BACK PAIN LATERALITY, UNSPECIFIED CHRONICITY: ICD-10-CM

## 2022-05-11 PROCEDURE — 3075F PR MOST RECENT SYSTOLIC BLOOD PRESS GE 130-139MM HG: ICD-10-PCS | Mod: CPTII,,, | Performed by: NURSE PRACTITIONER

## 2022-05-11 PROCEDURE — 74176 CT ABD & PELVIS W/O CONTRAST: CPT | Mod: 26,,, | Performed by: RADIOLOGY

## 2022-05-11 PROCEDURE — 99213 OFFICE O/P EST LOW 20 MIN: CPT | Mod: PBBFAC,25,PN | Performed by: NURSE PRACTITIONER

## 2022-05-11 PROCEDURE — 3008F BODY MASS INDEX DOCD: CPT | Mod: CPTII,,, | Performed by: NURSE PRACTITIONER

## 2022-05-11 PROCEDURE — 99999 PR PBB SHADOW E&M-EST. PATIENT-LVL III: CPT | Mod: PBBFAC,,, | Performed by: NURSE PRACTITIONER

## 2022-05-11 PROCEDURE — 74176 CT RENAL STONE STUDY ABD PELVIS WO: ICD-10-PCS | Mod: 26,,, | Performed by: RADIOLOGY

## 2022-05-11 PROCEDURE — 3008F PR BODY MASS INDEX (BMI) DOCUMENTED: ICD-10-PCS | Mod: CPTII,,, | Performed by: NURSE PRACTITIONER

## 2022-05-11 PROCEDURE — 74176 CT ABD & PELVIS W/O CONTRAST: CPT | Mod: TC

## 2022-05-11 PROCEDURE — 1159F MED LIST DOCD IN RCRD: CPT | Mod: CPTII,,, | Performed by: NURSE PRACTITIONER

## 2022-05-11 PROCEDURE — 99213 OFFICE O/P EST LOW 20 MIN: CPT | Mod: S$PBB,,, | Performed by: NURSE PRACTITIONER

## 2022-05-11 PROCEDURE — 3075F SYST BP GE 130 - 139MM HG: CPT | Mod: CPTII,,, | Performed by: NURSE PRACTITIONER

## 2022-05-11 PROCEDURE — 99213 PR OFFICE/OUTPT VISIT, EST, LEVL III, 20-29 MIN: ICD-10-PCS | Mod: S$PBB,,, | Performed by: NURSE PRACTITIONER

## 2022-05-11 PROCEDURE — 3079F PR MOST RECENT DIASTOLIC BLOOD PRESSURE 80-89 MM HG: ICD-10-PCS | Mod: CPTII,,, | Performed by: NURSE PRACTITIONER

## 2022-05-11 PROCEDURE — 1159F PR MEDICATION LIST DOCUMENTED IN MEDICAL RECORD: ICD-10-PCS | Mod: CPTII,,, | Performed by: NURSE PRACTITIONER

## 2022-05-11 PROCEDURE — 3079F DIAST BP 80-89 MM HG: CPT | Mod: CPTII,,, | Performed by: NURSE PRACTITIONER

## 2022-05-11 PROCEDURE — 96372 THER/PROPH/DIAG INJ SC/IM: CPT | Mod: PBBFAC,PN

## 2022-05-11 PROCEDURE — 99999 PR PBB SHADOW E&M-EST. PATIENT-LVL III: ICD-10-PCS | Mod: PBBFAC,,, | Performed by: NURSE PRACTITIONER

## 2022-05-11 RX ORDER — KETOROLAC TROMETHAMINE 30 MG/ML
60 INJECTION, SOLUTION INTRAMUSCULAR; INTRAVENOUS
Status: COMPLETED | OUTPATIENT
Start: 2022-05-11 | End: 2022-05-11

## 2022-05-11 RX ADMIN — KETOROLAC TROMETHAMINE 60 MG: 60 INJECTION, SOLUTION INTRAMUSCULAR at 10:05

## 2022-05-11 NOTE — PROGRESS NOTES
Subjective:       Patient ID: Chandra Stone is a 52 y.o. male.    Chief Complaint: Flank Pain Suspected Kidney Stone    History of Present Illness:   Chandra Stone 52 y.o. male presents today with reports of flank pain that has been present for about 3 days and progressively becoming worse. POCT u/a + for moderate blood. Patient denies any other problems or concerns at this time.     Past Medical History:   Diagnosis Date    HIV (human immunodeficiency virus infection)      Family History   Problem Relation Age of Onset    Hypertension Mother     Diabetes Mother     Diabetes Brother     Hypertension Brother      Social History     Socioeconomic History    Marital status: Single   Tobacco Use    Smoking status: Former Smoker     Years: 10.00    Smokeless tobacco: Former User    Tobacco comment: quit over 5 years ago    Substance and Sexual Activity    Alcohol use: Yes     Comment: 3/week     Drug use: Never     Outpatient Encounter Medications as of 5/11/2022   Medication Sig Dispense Refill    dneziqoht-xqxjblqv-sewlozj ala (BIKTARVY) -25 mg per tablet Take 1 tablet by mouth once daily.      diclofenac (VOLTAREN) 75 MG EC tablet Take 1 tablet (75 mg total) by mouth 2 (two) times daily as needed (back pain). 30 tablet 2    promethazine (PHENERGAN) 25 MG tablet Take 1 tablet (25 mg total) by mouth every 6 (six) hours as needed for Nausea. 21 tablet 0    tamsulosin (FLOMAX) 0.4 mg Cap Take 1 capsule (0.4 mg total) by mouth once daily. for 7 days 7 capsule 0    aspirin (ECOTRIN) 81 MG EC tablet Take 1 tablet (81 mg total) by mouth once daily. 30 tablet 11    ciprofloxacin HCl (CIPRO) 500 MG tablet Take 1 tablet (500 mg total) by mouth 2 (two) times daily. for 7 days 14 tablet 0    HYDROcodone-acetaminophen (NORCO) 7.5-325 mg per tablet Take 1 tablet by mouth every 8 (eight) hours as needed for Pain. 14 tablet 0    tiZANidine (ZANAFLEX) 4 MG tablet Take 1 tablet (4 mg total) by mouth  every 6 (six) hours as needed (back spasm). 30 tablet 1     Facility-Administered Encounter Medications as of 5/11/2022   Medication Dose Route Frequency Provider Last Rate Last Admin    ibuprofen tablet 600 mg  600 mg Oral 1 time in Clinic/HOD Anne-Marie Rondon MD        ketorolac injection 60 mg  60 mg Intramuscular 1 time in Clinic/HOD Georges Block DNP        [COMPLETED] ketorolac injection 60 mg  60 mg Intramuscular 1 time in Clinic[facility] Georges Block DNP   60 mg at 05/10/22 1006    [COMPLETED] ketorolac injection 60 mg  60 mg Intramuscular 1 time in Clinic/HOD Georges Block DNP   60 mg at 05/11/22 1050    ondansetron disintegrating tablet 4 mg  4 mg Oral 1 time in Clinic/HOD Anne-Marie Rondon MD           Review of Systems   Constitutional: Negative for activity change, appetite change, fatigue and fever.   HENT: Negative for congestion, ear discharge, ear pain, mouth sores, nosebleeds, sore throat and tinnitus.    Eyes: Negative for discharge, redness and itching.   Respiratory: Negative for apnea, cough and shortness of breath.    Cardiovascular: Negative for chest pain and leg swelling.   Gastrointestinal: Negative for abdominal distention, abdominal pain, blood in stool and constipation.   Endocrine: Negative for polydipsia, polyphagia and polyuria.   Genitourinary: Positive for flank pain (bilateral). Negative for difficulty urinating, frequency and hematuria.   Musculoskeletal: Negative for back pain, gait problem, neck pain and neck stiffness.   Skin: Negative for rash and wound.   Allergic/Immunologic: Negative for environmental allergies, food allergies and immunocompromised state.   Neurological: Negative for dizziness, seizures, syncope, numbness and headaches.   Hematological: Negative for adenopathy. Does not bruise/bleed easily.   Psychiatric/Behavioral: Negative for agitation, confusion, hallucinations, self-injury and suicidal ideas.       Objective:       /83 (BP Location: Right arm, Patient Position: Sitting, BP Method: Large (Automatic))   Pulse 68   Temp 98.1 °F (36.7 °C) (Temporal)   Wt 90.7 kg (200 lb)   SpO2 96%   BMI 29.53 kg/m²   Physical Exam  Vitals and nursing note reviewed.   Constitutional:       Appearance: He is well-developed.   HENT:      Head: Normocephalic.      Right Ear: External ear normal.      Left Ear: External ear normal.      Nose: Nose normal.   Eyes:      Conjunctiva/sclera: Conjunctivae normal.      Pupils: Pupils are equal, round, and reactive to light.   Neck:      Vascular: No JVD.   Cardiovascular:      Rate and Rhythm: Normal rate and regular rhythm.      Heart sounds: Normal heart sounds.   Pulmonary:      Effort: Pulmonary effort is normal.      Breath sounds: Normal breath sounds. No wheezing.   Abdominal:      General: Bowel sounds are normal.      Palpations: Abdomen is soft.      Tenderness: There is no abdominal tenderness. There is right CVA tenderness and left CVA tenderness.   Musculoskeletal:      Cervical back: Normal range of motion and neck supple.   Lymphadenopathy:      Cervical: No cervical adenopathy.   Skin:     General: Skin is warm and dry.   Neurological:      Mental Status: He is alert and oriented to person, place, and time.   Psychiatric:         Behavior: Behavior normal.         Thought Content: Thought content normal.         Judgment: Judgment normal.         Results for orders placed or performed in visit on 05/10/22   POCT URINE DIPSTICK WITHOUT MICROSCOPE   Result Value Ref Range    Color, UA Yellow     pH, UA 5     WBC, UA Negative     Nitrite, UA Negative     Protein, POC 30     Glucose, UA Negative     Ketones, UA Negative     Urobilinogen, UA Normal     Bilirubin, POC Negative     Blood, UA Large     Clarity, UA Clear     Spec Grav UA 1.030      Assessment:       1. Flank pain    2. Hematuria, unspecified type    3. Abdominal pain, unspecified abdominal location    4. Back pain,  unspecified back location, unspecified back pain laterality, unspecified chronicity        Plan:   Chandra was seen today for hip pain and other misc.    Diagnoses and all orders for this visit:    Flank pain  -     Basic Metabolic Panel; Future    Hematuria, unspecified type  -     Basic Metabolic Panel; Future    Abdominal pain, unspecified abdominal location  -     Cancel: CT Renal Stone Study ABD Pelvis WO; Future  -     CT Renal Stone Study ABD Pelvis WO; Future    Back pain, unspecified back location, unspecified back pain laterality, unspecified chronicity    Other orders  -     ketorolac injection 60 mg  -     tiZANidine (ZANAFLEX) 4 MG tablet; Take 1 tablet (4 mg total) by mouth every 6 (six) hours as needed (back spasm).  -     HYDROcodone-acetaminophen (NORCO) 7.5-325 mg per tablet; Take 1 tablet by mouth every 8 (eight) hours as needed for Pain.  -     ciprofloxacin HCl (CIPRO) 500 MG tablet; Take 1 tablet (500 mg total) by mouth 2 (two) times daily. for 7 days             [facility]   [address] [place] [address] [address]  [place] [address]  Office [phone]  Fax [phone]

## 2022-05-12 RX ORDER — TIZANIDINE 4 MG/1
4 TABLET ORAL EVERY 6 HOURS PRN
Qty: 30 TABLET | Refills: 1 | Status: SHIPPED | OUTPATIENT
Start: 2022-05-12 | End: 2022-05-22

## 2022-05-12 RX ORDER — CIPROFLOXACIN 500 MG/1
500 TABLET ORAL 2 TIMES DAILY
Qty: 14 TABLET | Refills: 0 | Status: SHIPPED | OUTPATIENT
Start: 2022-05-12 | End: 2022-05-19

## 2022-05-12 RX ORDER — HYDROCODONE BITARTRATE AND ACETAMINOPHEN 7.5; 325 MG/1; MG/1
1 TABLET ORAL EVERY 8 HOURS PRN
Qty: 14 TABLET | Refills: 0 | Status: SHIPPED | OUTPATIENT
Start: 2022-05-12 | End: 2022-05-19

## 2022-08-22 ENCOUNTER — HOSPITAL ENCOUNTER (OUTPATIENT)
Dept: RADIOLOGY | Facility: HOSPITAL | Age: 52
Discharge: HOME | End: 2022-08-22
Attending: FAMILY MEDICINE
Payer: MEDICAID

## 2022-08-22 ENCOUNTER — OFFICE VISIT (OUTPATIENT)
Dept: PRIMARY CARE CLINIC | Facility: CLINIC | Age: 52
End: 2022-08-22
Payer: MEDICAID

## 2022-08-22 VITALS
WEIGHT: 207 LBS | DIASTOLIC BLOOD PRESSURE: 82 MMHG | BODY MASS INDEX: 31.37 KG/M2 | TEMPERATURE: 97 F | HEIGHT: 68 IN | HEART RATE: 76 BPM | SYSTOLIC BLOOD PRESSURE: 136 MMHG

## 2022-08-22 DIAGNOSIS — M75.01 ADHESIVE CAPSULITIS OF RIGHT SHOULDER: ICD-10-CM

## 2022-08-22 DIAGNOSIS — Z12.11 COLON CANCER SCREENING: ICD-10-CM

## 2022-08-22 DIAGNOSIS — R52 ACUTE PAIN: ICD-10-CM

## 2022-08-22 DIAGNOSIS — M75.31 CALCIFIC TENDINITIS OF RIGHT SHOULDER: Primary | ICD-10-CM

## 2022-08-22 PROCEDURE — 20610: ICD-10-PCS | Mod: S$PBB,RT,, | Performed by: FAMILY MEDICINE

## 2022-08-22 PROCEDURE — 3075F PR MOST RECENT SYSTOLIC BLOOD PRESS GE 130-139MM HG: ICD-10-PCS | Mod: CPTII,,, | Performed by: FAMILY MEDICINE

## 2022-08-22 PROCEDURE — 99214 OFFICE O/P EST MOD 30 MIN: CPT | Mod: S$PBB,25,, | Performed by: FAMILY MEDICINE

## 2022-08-22 PROCEDURE — 20610 DRAIN/INJ JOINT/BURSA W/O US: CPT | Mod: PBBFAC,PN | Performed by: FAMILY MEDICINE

## 2022-08-22 PROCEDURE — 1159F PR MEDICATION LIST DOCUMENTED IN MEDICAL RECORD: ICD-10-PCS | Mod: CPTII,,, | Performed by: FAMILY MEDICINE

## 2022-08-22 PROCEDURE — 99999 PR PBB SHADOW E&M-EST. PATIENT-LVL V: CPT | Mod: PBBFAC,,, | Performed by: FAMILY MEDICINE

## 2022-08-22 PROCEDURE — 73030 X-RAY EXAM OF SHOULDER: CPT | Mod: 26,RT,, | Performed by: RADIOLOGY

## 2022-08-22 PROCEDURE — 3008F BODY MASS INDEX DOCD: CPT | Mod: CPTII,,, | Performed by: FAMILY MEDICINE

## 2022-08-22 PROCEDURE — 1160F PR REVIEW ALL MEDS BY PRESCRIBER/CLIN PHARMACIST DOCUMENTED: ICD-10-PCS | Mod: CPTII,,, | Performed by: FAMILY MEDICINE

## 2022-08-22 PROCEDURE — 3079F DIAST BP 80-89 MM HG: CPT | Mod: CPTII,,, | Performed by: FAMILY MEDICINE

## 2022-08-22 PROCEDURE — 3075F SYST BP GE 130 - 139MM HG: CPT | Mod: CPTII,,, | Performed by: FAMILY MEDICINE

## 2022-08-22 PROCEDURE — 73030 X-RAY EXAM OF SHOULDER: CPT | Mod: TC,PN,RT

## 2022-08-22 PROCEDURE — 99215 OFFICE O/P EST HI 40 MIN: CPT | Mod: PBBFAC,PN,25 | Performed by: FAMILY MEDICINE

## 2022-08-22 PROCEDURE — 3079F PR MOST RECENT DIASTOLIC BLOOD PRESSURE 80-89 MM HG: ICD-10-PCS | Mod: CPTII,,, | Performed by: FAMILY MEDICINE

## 2022-08-22 PROCEDURE — 99214 PR OFFICE/OUTPT VISIT, EST, LEVL IV, 30-39 MIN: ICD-10-PCS | Mod: S$PBB,25,, | Performed by: FAMILY MEDICINE

## 2022-08-22 PROCEDURE — 1159F MED LIST DOCD IN RCRD: CPT | Mod: CPTII,,, | Performed by: FAMILY MEDICINE

## 2022-08-22 PROCEDURE — 3008F PR BODY MASS INDEX (BMI) DOCUMENTED: ICD-10-PCS | Mod: CPTII,,, | Performed by: FAMILY MEDICINE

## 2022-08-22 PROCEDURE — 1160F RVW MEDS BY RX/DR IN RCRD: CPT | Mod: CPTII,,, | Performed by: FAMILY MEDICINE

## 2022-08-22 PROCEDURE — 73030 XR SHOULDER COMPLETE 2 OR MORE VIEWS RIGHT: ICD-10-PCS | Mod: 26,RT,, | Performed by: RADIOLOGY

## 2022-08-22 PROCEDURE — 99999 PR PBB SHADOW E&M-EST. PATIENT-LVL V: ICD-10-PCS | Mod: PBBFAC,,, | Performed by: FAMILY MEDICINE

## 2022-08-22 RX ORDER — HYDROCODONE BITARTRATE AND ACETAMINOPHEN 7.5; 325 MG/1; MG/1
1 TABLET ORAL EVERY 6 HOURS PRN
Qty: 12 TABLET | Refills: 0 | Status: SHIPPED | OUTPATIENT
Start: 2022-08-22 | End: 2022-08-26

## 2022-08-22 RX ORDER — LIDOCAINE HYDROCHLORIDE 10 MG/ML
4 INJECTION INFILTRATION; PERINEURAL
Status: DISCONTINUED | OUTPATIENT
Start: 2022-08-22 | End: 2022-08-23 | Stop reason: HOSPADM

## 2022-08-22 RX ORDER — TRIAMCINOLONE ACETONIDE 40 MG/ML
40 INJECTION, SUSPENSION INTRA-ARTICULAR; INTRAMUSCULAR
Status: DISCONTINUED | OUTPATIENT
Start: 2022-08-22 | End: 2022-08-23 | Stop reason: HOSPADM

## 2022-08-22 RX ADMIN — LIDOCAINE HYDROCHLORIDE 4 ML: 10 INJECTION, SOLUTION INFILTRATION; PERINEURAL at 10:08

## 2022-08-22 RX ADMIN — TRIAMCINOLONE ACETONIDE 40 MG: 40 INJECTION, SUSPENSION INTRA-ARTICULAR; INTRAMUSCULAR at 10:08

## 2022-08-22 NOTE — PROGRESS NOTES
Subjective:       Patient ID: Chandra Stone is a 52 y.o. male.    Chief Complaint: Other (Shoulder pain)      HPI   History of Present Illness:   Chandra Stone 52 y.o. male presents today with     New   Location: right shoulder, top  Onset: 5 days  Provocation:none  Quality: sharp throbbing ache with dec rom  Radiation: none  Severe  Aggravating: any movt,  30 degree limit on lateral elevation  Alleviating: resting  Treatment so far: none      Past Medical History:   Diagnosis Date    HIV (human immunodeficiency virus infection)      Family History   Problem Relation Age of Onset    Hypertension Mother     Diabetes Mother     Diabetes Brother     Hypertension Brother      Social History     Socioeconomic History    Marital status: Single   Tobacco Use    Smoking status: Former Smoker     Years: 10.00    Smokeless tobacco: Former User    Tobacco comment: quit over 5 years ago    Substance and Sexual Activity    Alcohol use: Yes     Comment: 3/week     Drug use: Never     Outpatient Encounter Medications as of 8/22/2022   Medication Sig Dispense Refill    yddpzadyi-vqwtdqfz-ryrkndv ala (BIKTARVY) -25 mg per tablet Take 1 tablet by mouth once daily.      diclofenac (VOLTAREN) 75 MG EC tablet Take 1 tablet (75 mg total) by mouth 2 (two) times daily as needed (back pain). 30 tablet 2    aspirin (ECOTRIN) 81 MG EC tablet Take 1 tablet (81 mg total) by mouth once daily. 30 tablet 11    HYDROcodone-acetaminophen (NORCO) 7.5-325 mg per tablet Take 1 tablet by mouth every 6 (six) hours as needed for Pain. 12 tablet 0    tamsulosin (FLOMAX) 0.4 mg Cap Take 1 capsule (0.4 mg total) by mouth once daily. for 7 days 7 capsule 0     Facility-Administered Encounter Medications as of 8/22/2022   Medication Dose Route Frequency Provider Last Rate Last Admin    ibuprofen tablet 600 mg  600 mg Oral 1 time in Clinic/HOD Anne-Marie Rondon MD        ketorolac injection 60 mg  60 mg Intramuscular 1 time in  "Clinic/HOD Georges Block, IRWIN        LIDOcaine HCL 10 mg/ml (1%) injection 4 mL  4 mL   Anne-Marie Rondon MD   4 mL at 08/22/22 1040    ondansetron disintegrating tablet 4 mg  4 mg Oral 1 time in Clinic/HOD Anne-Marie Rondon MD        triamcinolone acetonide injection 40 mg  40 mg Intra-articular  Anne-Marie Rondon MD   40 mg at 08/22/22 1040       Review of Systems    Review of Systems      A complete 10 point ROS was completed and are positive as per above HPI.    Otherwise negative for fever, diplopia, chest pain, shortness of breath, vomiting, blood in urine, joint pain, skin rash, seizures and unusual bleeding.     Objective:           /82 (BP Location: Left arm, Patient Position: Sitting, BP Method: Medium (Manual))   Pulse 76   Temp 97 °F (36.1 °C) (Temporal)   Ht 5' 8" (1.727 m)   Wt 93.9 kg (207 lb)   BMI 31.47 kg/m²   Physical Exam  Musculoskeletal:      Right shoulder: Tenderness present. Decreased range of motion.        Arms:       Comments: Only able to passively lift of the right arm 30 degrees laterally due to discomfort, unable to tolerate exam.           Results for orders placed or performed in visit on 05/11/22   Basic Metabolic Panel   Result Value Ref Range    Sodium 138 136 - 145 mmol/L    Potassium 3.6 3.5 - 5.1 mmol/L    Chloride 105 95 - 110 mmol/L    CO2 21 (L) 23 - 29 mmol/L    Glucose 142 (H) 70 - 110 mg/dL    BUN 11 6 - 20 mg/dL    Creatinine 1.3 0.5 - 1.4 mg/dL    Calcium 9.0 8.7 - 10.5 mg/dL    Anion Gap 12 8 - 16 mmol/L    eGFR if African American >60 >60 mL/min/1.73 m^2    eGFR if non African American >60 >60 mL/min/1.73 m^2     Assessment:       1. Calcific tendinitis of right shoulder    2. Adhesive capsulitis of right shoulder    3. Colon cancer screening    4. Acute pain        Plan:   Calcific tendinitis of right shoulder  Comments:  per xry  Orders:  -     Ambulatory referral/consult to Physical/Occupational Therapy; Future; Expected date: " 08/29/2022    Adhesive capsulitis of right shoulder  Comments:  presumed before xray  Orders:  -     X-ray Shoulder 2 or More Views Right; Future; Expected date: 08/22/2022  -     Large Joint Aspiration/Injection: shoulder injection, right: R glenohumeral  -     triamcinolone acetonide injection 40 mg  -     LIDOcaine HCL 10 mg/ml (1%) injection 4 mL    Colon cancer screening  -     Cologuard Screening (Multitarget Stool DNA); Future; Expected date: 08/22/2022    Acute pain  -     HYDROcodone-acetaminophen (NORCO) 7.5-325 mg per tablet; Take 1 tablet by mouth every 6 (six) hours as needed for Pain.  Dispense: 12 tablet; Refill: 0        Initially suspected to be adhesive capsulitis based on presentation and sxs. Xray confirmed calcific tendinitis which could progress to the initial dx.  Injection was given at the time to relieve pain and trial of PT and if he cannot tolerate it, ortho referral.      Treatment options and alternatives were discussed with the patient. Patient was given ample time to ask questions. All questions were answered. Voices understanding and acceptance of this advice. Will call back if any further questions or concerns.  Anne-Marie Rondon MD

## 2022-08-23 NOTE — PROCEDURES
Large Joint Aspiration/Injection: shoulder injection, right: R glenohumeral    Date/Time: 8/22/2022 10:40 AM  Performed by: Anne-Marie Rondon MD  Authorized by: Anne-Marie Rondon MD     Consent Done?:  Yes (Written)  Indications:  Pain  Site marked: the procedure site was marked    Timeout: prior to procedure the correct patient, procedure, and site was verified    Prep: patient was prepped and draped in usual sterile fashion      Local anesthesia used?: Yes    Local anesthetic:  Lidocaine spray    Details:  Needle Size:  25 G  Ultrasonic Guidance for needle placement?: No    Approach:  Posterior  Location:  Shoulder  Site:  R glenohumeral  Medications:  40 mg triamcinolone acetonide 40 mg/mL; 4 mL LIDOcaine HCL 10 mg/ml (1%) 10 mg/mL (1 %)  Patient tolerance:  Patient tolerated the procedure well with no immediate complications     Rest, Ice, and Compression for 24 hours.  Watch for any redness, swelling, or pain as potential signs of infections.  Return to clinic if any fever or suspicion of infection  Gradual return to normal activity.

## 2022-09-19 ENCOUNTER — CLINICAL SUPPORT (OUTPATIENT)
Dept: REHABILITATION | Facility: HOSPITAL | Age: 52
End: 2022-09-19
Attending: FAMILY MEDICINE
Payer: MEDICAID

## 2022-09-19 DIAGNOSIS — R53.1 DECREASED STRENGTH: ICD-10-CM

## 2022-09-19 DIAGNOSIS — M25.60 DECREASED RANGE OF MOTION: ICD-10-CM

## 2022-09-19 DIAGNOSIS — R68.89 DECREASED FUNCTIONAL ACTIVITY TOLERANCE: ICD-10-CM

## 2022-09-19 DIAGNOSIS — M75.31 CALCIFIC TENDINITIS OF RIGHT SHOULDER: ICD-10-CM

## 2022-09-19 PROCEDURE — 97140 MANUAL THERAPY 1/> REGIONS: CPT

## 2022-09-19 PROCEDURE — 97162 PT EVAL MOD COMPLEX 30 MIN: CPT

## 2022-09-19 NOTE — PLAN OF CARE
[facility]   Physical Therapy Initial Evaluation     Date: 9/19/2022   Name: Chandra HOOD Stone  [facility] Number: [id number]    Therapy Diagnosis:   Encounter Diagnoses   Name Primary?    Calcific tendinitis of right shoulder     Decreased range of motion     Decreased strength     Decreased functional activity tolerance      Physician: Anne-Marie Rondon MD    Physician Orders: PT Eval and Treat   Medical Diagnosis from Referral: Calcific tendinitis of right shoulder  Evaluation Date: 9/19/2022  Authorization Period Expiration: 8/22/23  Plan of Care Expiration: 11/19/22  Progress Note Due: 10/19/22  Visit # / Visits authorized: 1/1   FOTO: 1/3     Precautions: blood and bodily fluid, Immunosuppression, and HIV +    Time In: 4:00 pm  Time Out: 4:45 pm  Total Appointment Time (timed & untimed codes): 40 minutes    SUBJECTIVE   Date of onset: ~2 and half weeks ago    History of current condition - Chandra reports: Woke up and thought that he slept wrong right shoulder hurting, as day went by still hurting and by Friday and through weekend hurt worse and felt like was having decreased ROM, 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   Followed with Dr Rondon and showed her the decreased ROM and told about pain. She thought was maybe frozen shoulder and did x-ray and could see calcification.  Got an injection and almost immediately after had full ROM and decreased pain.  Still not painful since injection.    Falls: none    Imaging, x-rays: Impression: No fracture or dislocation right shoulder.  Findings suggestive of calcific tendinosis involving the likely supraspinatus tendinous insertion.    Prior Therapy: yes, for something else  Social History: patient  lives alone  Occupation: Phlebotomist  Prior Level of Function: unrestricted use of shoulder   Current Level of Function: currently - no difficulty with shoulder since shot. Prior to shot was unable to lift arm away from body    Pain:  Current 0/10, worst  9.5/10, best 0/10   Location: right shoulder    Description: Deep and Sharp pain with movement  Aggravating Factors: moving shoulder   Easing Factors: rest, injection    Patients goals: Resolution, & for pain not to come back.     Medical History:   Past Medical History:   Diagnosis Date    HIV (human immunodeficiency virus infection)        Surgical History:   Chandra Stone  has a past surgical history that includes Circumcision.    Medications:   Chandra has a current medication list which includes the following prescription(s): aspirin, biktarvy, diclofenac, and tamsulosin.    Allergies:   Review of patient's allergies indicates:  No Known Allergies       OBJECTIVE     CMS Impairment/Limitation/Restriction for FOTO shoulder Survey    Therapist reviewed FOTO scores for Chandra Stone on 9/19/2022.   FOTO documents entered into [employer] - see Media section.    Limitation Score: 42%         Posture: Pt noted to present with forward head/rounded shoulder posture, decreased thoracic Kyphosis, decreased lumbar lordosis.    B scapula elevated and protracted, right  scapula greater than left.  poor scapulo/humeral rhythm on right .      ROM:  Shoulder  AROM/PROM Right Left Pain/Dysfunction with Movement   flexion  140 / pain at end range  180/WNL    extension  50 / NT  48/WNL    abduction  120 / 110  170/WNL Pain right    Internal rotation  T/L junction / WFL  L3/WNL Uncomfortable on right     ER  T4 / 60  T2/WNL Painful on right      Cervical spine cleared.    Strength:  U/E MMT Right Left Pain/Dysfunction with Movement   Cervical SB 4/5 4/5    Upper Trapezial 4/5 4/5    Shoulder Flexion 4/5 4/5    Shoulder Abduction 4/5 4/5    Elbow Flexion (C5)  4/5 4/5    Elbow Extension (C7) 4/5 4/5    Shoulder ER  @ 0* Abduction 4/5 4/5    Shoulder IR  @ 0* Abduction 4/5 4/5    Wrist extension (C6) 4+/5 4+/5     Strength  4+/5 4+/5    Intrinsic strength (4th/5th fingers) 3+/5 4/5    Serratus Anterior 4-/5 4-/5     Supraspinatus NT pain 4-/5 Pain with test position   Mid Traps NT pain 3+/5 Pain with test position   Low Traps 3/5 3+/5      Sensation: Intact to light touch bilateral  UE's, all dermatomes.    Reflexes:  Tricep: defer  Brachialis: defer    Special Test:  Cabrera +  Neers  +     Empty Can + pain only Drop Arm -     Apprehension -  Sulcus  -    AC crossover NT  Lift off  + pain/strength      Joint Mobility: slightly decreased    Upper Limb Tension Test:   defer      Palpation:  Patient tender with increased tone over right upper trapezial muscle, levator scapula, latissimus dorsi, teres minor, posterior deltoid, infraspinatus, subscapularis, medial/lateral scapular muscle's.     TREATMENT     Total Treatment time (time-based codes) separate from Evaluation: 10 minutes      Chandra received the treatments listed below:      therapeutic exercises to develop strength, endurance, posture, and core stabilization for 2 minutes including:    Intervention 9/19/2022  Parameters   Home exercise program  x                            manual therapy techniques: Myofacial release and Soft tissue Mobilization were applied to the: right shoulder for 8 minutes, including:    Manual Intervention 9/19/2022     Soft Tissue Mobilization x right upper trapezius, levator scapulae , periscapular musculature, latissimus dorsi , infraspinatus , teres major and minor , subscapularis , and subscapular releases.   Joint Mobilizations     Mobilization with movement x Scapular tilting        Functional Dry Needling             PATIENT EDUCATION AND HOME EXERCISES     Education provided:   - home exercise program     Written Home Exercises Provided: yes. Exercises were reviewed and Chandra was able to demonstrate them prior to the end of the session.  Chandra demonstrated good  understanding of the education provided. See EMR under Patient Instructions for exercises provided during therapy sessions.    ASSESSMENT     Chandra is a 52 y.o. male referred  to outpatient Physical Therapy with a medical diagnosis of Calcific tendinitis of right shoulder. The patient presents with signs and symptoms consistent with diagnosis along with impairments which include weakness, impaired endurance, decreased upper extremity function, abnormal tone, decreased ROM, and impaired joint extensibility.  These impairments are limiting patient's ability to:   Place a 25 lb. box on a shelf overhead   Participate in rigorous contact sports  Reach an overhead shelf   Reach a shelf that is at shoulder height    Pt prognosis is Good, if patient is consistent and compliant with PT and HEP .   Pt will benefit from skilled outpatient Physical Therapy to address the deficits stated above and in the chart below, provide pt/family education, and to maximize pt's level of independence.     Plan of care discussed with patient: Yes  Pt's spiritual, cultural and educational needs considered and patient is agreeable to the plan of care and goals as stated below:     Anticipated Barriers for therapy: Back pain    Medical Necessity is demonstrated by the following  History  Co-morbidities and personal factors that may impact the plan of care Co-morbidities:   HIV/AIDS, back pain    Personal Factors:   coping style     moderate   Examination  Body Structures and Functions, activity limitations and participation restrictions that may impact the plan of care Body Regions:   upper extremities  trunk    Body Systems:    ROM  strength  joint mobility, muscle tone, muscle length    Participation Restrictions:   See current level of function listed above     Activity limitations:   Learning and applying knowledge  no deficits    General Tasks and Commands  no deficits    Communication  no deficits    Mobility  lifting and carrying objects  Reaching behind and overhead     Self care  no deficits    Domestic Life  Reaching anything at shoulder height or above    Interactions/Relationships  no deficits    Life  Areas  no deficits    Community and Social Life  community life  recreation and leisure         moderate   Clinical Presentation evolving clinical presentation with changing clinical characteristics moderate   Decision Making/ Complexity Score: moderate     Goals:  Short Term Goals: In 4 weeks   1.Patient  to be educated on home exercise program.  2.Patient to increase shoulder AROM to equal left , in order to improve function of involved UE.  3.Patient to increase strength by 1/2 grade, in order to improve endurance with activity.  4.Patient to report pain at 0/10 at worst, in order to improve QOL.  5.Patient to improve score on the FOTO, in order to improve QOL.   6. Patient  to be educated on postural and body mechanics awareness.    Long Term Goals: In 8 weeks  1. Patient to improve score on the FOTO to 31% or less, in order to improve QOL.  2. Patient to demonstrate increase in UE strength to 4+/5, in order to improve endurance with activity.  3. Patient to perform daily activities including:   Carry a heavy object (over 10 lbs) - No difficulty  Pull a medium weight object (5-10 lbs.) from under a bed - No difficulty  Lower a lightweight object (1-5 lb.) from the top shelf of a closet - No difficulty  Place a can of soup (1 lb.) on a shelf overhead - No difficulty  Push open a heavy door - No difficulty  Carry a shopping bag or briefcase - No difficulty  Move a heavy skillet (eg, cast iron skillet) from one stove burner to another - No difficulty  Reach and pull the string that controls a light or fan - No difficulty  Adjusting the back of your collar - No difficulty  Throw a ball underhand - No difficulty      PLAN     Plan of care Certification: 9/19/2022 to 11/19/22.    Outpatient Physical Therapy 1 times weekly for 8 weeks to include the following interventions: Electrical Stimulation PRN, Manual Therapy, Moist Heat/ Ice, Neuromuscular Re-ed, Patient Education, Self Care, Therapeutic Activities, and  Therapeutic Exercise.     Inge Corea, PT      I CERTIFY THE NEED FOR THESE SERVICES FURNISHED UNDER THIS PLAN OF TREATMENT AND WHILE UNDER MY CARE   Physician's comments:     Physician's Signature: ___________________________________________________

## 2022-09-23 PROBLEM — R68.89 DECREASED FUNCTIONAL ACTIVITY TOLERANCE: Status: ACTIVE | Noted: 2022-09-23

## 2022-09-23 PROBLEM — R53.1 DECREASED STRENGTH: Status: ACTIVE | Noted: 2022-09-23

## 2022-09-23 PROBLEM — M25.60 DECREASED RANGE OF MOTION: Status: ACTIVE | Noted: 2022-09-23

## 2022-09-26 ENCOUNTER — DOCUMENTATION ONLY (OUTPATIENT)
Dept: REHABILITATION | Facility: HOSPITAL | Age: 52
End: 2022-09-26
Payer: MEDICAID

## 2022-09-26 ENCOUNTER — CLINICAL SUPPORT (OUTPATIENT)
Dept: REHABILITATION | Facility: HOSPITAL | Age: 52
End: 2022-09-26
Payer: MEDICAID

## 2022-09-26 DIAGNOSIS — M25.60 DECREASED RANGE OF MOTION: Primary | ICD-10-CM

## 2022-09-26 DIAGNOSIS — R53.1 DECREASED STRENGTH: ICD-10-CM

## 2022-09-26 DIAGNOSIS — R68.89 DECREASED FUNCTIONAL ACTIVITY TOLERANCE: ICD-10-CM

## 2022-09-26 PROCEDURE — 97110 THERAPEUTIC EXERCISES: CPT | Mod: CQ

## 2022-09-26 NOTE — PROGRESS NOTES
PT/PTA met face to face to discuss pt's treatment plan and progress towards established goals. Pt will be seen by a physical therapist minimally every 6th visit or every 30 days.      Rip Hammonds PTA

## 2022-09-26 NOTE — PROGRESS NOTES
[facility]   Physical Therapy Treatment Note     Name: Chandra HOOD Stone  Clinic Number: [id number]    Therapy Diagnosis:   Encounter Diagnoses   Name Primary?    Decreased range of motion Yes    Decreased strength     Decreased functional activity tolerance      Physician: Anne-Marie Rondon MD    Visit Date: 9/26/2022    Physician Orders: PT Eval and Treat   Medical Diagnosis from Referral: Calcific tendinitis of right shoulder  Evaluation Date: 9/19/2022  Authorization Period Expiration: 8/22/23  Plan of Care Expiration: 11/19/22  Progress Note Due: 10/19/22  Visit # / Visits authorized: 1/20 (+ evaluation)  FOTO: 1/3      Precautions: blood and bodily fluid, Immunosuppression, and HIV +    PTA Visit #: 1/5     Time In: 0830  Time Out: 0915  Total Billable Time: 40 minutes Billing reflects [place] medicaid guidelines, billing all therapy as therapeutic-exercise     SUBJECTIVE     Patient reports: no pain since injections but realizes that the injection relief will be temporary and that he needs to have more of a permanent fix. Before injection, patient reports that he had no range of motion and was unable to do anything with his right upper extremity but since, he has no limitations    He was compliant with home exercise program.    Response to previous treatment: felt fine after the evaluation    Functional change: no limitations since his injection    Pain: 0/10     Location: right shoulder (no pain today)    OBJECTIVE     Objective Measures updated at progress report unless specified.       TREATMENT     Chandra received the treatments listed below:     THERAPEUTIC EXERCISES to develop strength, endurance, ROM, flexibility, posture, and core stabilization for (40) minutes including:    Intervention Performed Today    UBE  x 3 minutes forward / backward    Scapula retraction x 2 x 10 prone   Shoulder extension x 2 x 10 prone   rows x 2 x 10 prone   Codman's  x 2 minutes each forward,  lateral, and counter clockwise and clockwise       Bilateral shoulder External rotation  x 2 x 10    Serratus anterior  x 3 x 8 bilateral supine   Sitting with good posture       Plan for Next Visit:        PATIENT EDUCATION AND HOME EXERCISES     Home Exercises Provided and Patient Education Provided     Education provided: (during session) minutes  PURPOSE: Patient educated on the impairments noted above and the effects of physical therapy intervention to improve overall condition and QOL.   EXERCISE: Patient was educated on all the above exercise prior/during/after for proper posture, positioning, and execution for safe performance with home exercise program.   STRENGTH: Patient educated on the importance of improved core and extremity strength in order to improve alignment of the spine and extremities with static positions and dynamic movement.     Written Home Exercises Provided: yes.  Exercises were reviewed and Chandra was able to demonstrate them prior to the end of the session.  Chandra demonstrated good  understanding of the education provided. See EMR under Patient Instructions for exercises provided during therapy sessions.      ASSESSMENT     All though patient reports his exercises are going well at home, he required verbal cues while using his home exercise program pictures to perform the exercises. He is noted to have decreased muscle strength and poor motor planning with supine serratus anterior exercise but with minimal improvement as exercise progressed. Suspect that he will improve with his exercises quickly.       Chandra is progressing well towards his goals.   Pt prognosis is Good.     Pt will continue to benefit from skilled outpatient physical therapy to address the deficits listed in the problem list box on initial evaluation, provide pt/family education and to maximize pt's level of independence in the home and community environment.     Pt's spiritual, cultural and educational needs considered  and pt agreeable to plan of care and goals.     Anticipated Barriers for therapy: Back pain    Goals:  Short Term Goals: In 4 weeks   1.Patient  to be educated on home exercise program.  2.Patient to increase shoulder AROM to equal left , in order to improve function of involved UE.  3.Patient to increase strength by 1/2 grade, in order to improve endurance with activity.  4.Patient to report pain at 0/10 at worst, in order to improve QOL.  5.Patient to improve score on the FOTO, in order to improve QOL.   6. Patient  to be educated on postural and body mechanics awareness.     Long Term Goals: In 8 weeks  1. Patient to improve score on the FOTO to 31% or less, in order to improve QOL.  2. Patient to demonstrate increase in UE strength to 4+/5, in order to improve endurance with activity.  3. Patient to perform daily activities including:              Carry a heavy object (over 10 lbs) - No difficulty  Pull a medium weight object (5-10 lbs.) from under a bed - No difficulty  Lower a lightweight object (1-5 lb.) from the top shelf of a closet - No difficulty  Place a can of soup (1 lb.) on a shelf overhead - No difficulty  Push open a heavy door - No difficulty  Carry a shopping bag or briefcase - No difficulty  Move a heavy skillet (eg, cast iron skillet) from one stove burner to another - No difficulty  Reach and pull the string that controls a light or fan - No difficulty  Adjusting the back of your collar - No difficulty  Throw a ball underhand - No difficulty       PLAN     Continue Plan of Care (POC) and progress per patient tolerance. See treatment section for details on planned progressions next session.      Rip Hammonds, PTA

## 2022-10-03 ENCOUNTER — CLINICAL SUPPORT (OUTPATIENT)
Dept: REHABILITATION | Facility: HOSPITAL | Age: 52
End: 2022-10-03
Payer: MEDICAID

## 2022-10-03 DIAGNOSIS — R53.1 DECREASED STRENGTH: ICD-10-CM

## 2022-10-03 DIAGNOSIS — M25.60 DECREASED RANGE OF MOTION: Primary | ICD-10-CM

## 2022-10-03 DIAGNOSIS — R68.89 DECREASED FUNCTIONAL ACTIVITY TOLERANCE: ICD-10-CM

## 2022-10-03 PROCEDURE — 97140 MANUAL THERAPY 1/> REGIONS: CPT

## 2022-10-03 PROCEDURE — 97110 THERAPEUTIC EXERCISES: CPT

## 2022-10-03 NOTE — PROGRESS NOTES
[facility]   Physical Therapy Treatment Note     Name: Chandra HOOD [facility] Number: [id number]    Therapy Diagnosis:   Encounter Diagnoses   Name Primary?    Decreased range of motion Yes    Decreased strength     Decreased functional activity tolerance        Physician: Anne-Marie Rondon MD    Visit Date: 10/3/2022    Physician Orders: PT Eval and Treat   Medical Diagnosis from Referral: Calcific tendinitis of right shoulder  Evaluation Date: 9/19/2022  Authorization Period Expiration: 8/22/23  Plan of Care Expiration: 11/19/22  Progress Note Due: 10/19/22  Visit # / Visits authorized: 2/20 (+ 1)  FOTO: 1/3   PTA Visit #: 0/5      Precautions: blood and bodily fluid, Immunosuppression, and HIV+      Time In: 11:10 am  Time Out:11:55 am  Total Billable Time: 40 minutes Billing reflects [place] medicaid guidelines, billing all therapy as therapeutic-exercise     SUBJECTIVE     Patient reports: no pain at start of treatment session, soreness post treatment session.  Will monitor and let us know how he feels.    He was compliant with home exercise program.    Response to previous treatment: felt fine after the evaluation    Functional change: no limitations since his injection    Pain: 0/10     Location: right shoulder (no pain today)    OBJECTIVE     Objective Measures updated at progress report unless specified.       TREATMENT     Chandra received the treatments listed below:     THERAPEUTIC EXERCISES to develop strength, endurance, ROM, flexibility, posture, and core stabilization for (30) minutes including:    Intervention Performed Today    UBE  x 6 minutes forward    Scapula retraction  2 x 10 prone   Shoulder extension x 2 x 10 prone   rows x 2 x 10 prone   Prone W's x 10x   1/2 foam roll series x  x  x  x  x 3 min pectoral stretch  Swimmers 10x  Hugs 10x  [place] 10x  Serratus punch 10x   Codman's   2 minutes each forward, lateral, and counter clockwise and clockwise        Bilateral shoulder External rotation  x 2 x 10 red band   Serratus anterior   3 x 8 bilateral supine   Sitting with good posture       Plan for Next Visit:        Chandra received the following manual therapy techniques: Joint mobilizations, Myofacial release, and Soft tissue Mobilization were applied to the: right upper quadrant for 10 minutes, including:    Manual Intervention 10/3/2022     Soft Tissue Mobilization x right upper trapezius, levator scapulae , periscapular musculature, rhomboids , latissimus dorsi , pectorals, teres major and minor , subscapularis , and deltoid   Joint Mobilizations     Mobilization with movement x Subscapularis release        Functional Dry Needling            PATIENT EDUCATION AND HOME EXERCISES     Home Exercises Provided and Patient Education Provided     Education provided: (during session) minutes  PURPOSE: Patient educated on the impairments noted above and the effects of physical therapy intervention to improve overall condition and QOL.   EXERCISE: Patient was educated on all the above exercise prior/during/after for proper posture, positioning, and execution for safe performance with home exercise program.   STRENGTH: Patient educated on the importance of improved core and extremity strength in order to improve alignment of the spine and extremities with static positions and dynamic movement.     Written Home Exercises Provided: patient was instructed to continue with prior home exercise program.  Exercises were reviewed and Chandra was able to demonstrate them prior to the end of the session.  Chandra demonstrated good  understanding of the education provided. See EMR under Patient Instructions for exercises provided during therapy sessions.      ASSESSMENT     Patient reports improved symptoms with manual treatment but some muscle soreness with therapeutic exercise today. Patient was encouraged to participate with home exercise program.    Chandra is progressing well towards  his goals.   Pt prognosis is Good.     Pt will continue to benefit from skilled outpatient physical therapy to address the deficits listed in the problem list box on initial evaluation, provide pt/family education and to maximize pt's level of independence in the home and community environment.     Pt's spiritual, cultural and educational needs considered and pt agreeable to plan of care and goals.     Anticipated Barriers for therapy: Back pain    Goals:  Reviewed: 10/3/2022    Short Term Goals: In 4 weeks   1.Patient  to be educated on home exercise program.  2.Patient to increase shoulder AROM to equal left , in order to improve function of involved UE.  3.Patient to increase strength by 1/2 grade, in order to improve endurance with activity.  4.Patient to report pain at 0/10 at worst, in order to improve QOL.  5.Patient to improve score on the FOTO, in order to improve QOL.   6. Patient  to be educated on postural and body mechanics awareness.     Long Term Goals: In 8 weeks  1. Patient to improve score on the FOTO to 31% or less, in order to improve QOL.  2. Patient to demonstrate increase in UE strength to 4+/5, in order to improve endurance with activity.  3. Patient to perform daily activities including:              Carry a heavy object (over 10 lbs) - No difficulty  Pull a medium weight object (5-10 lbs.) from under a bed - No difficulty  Lower a lightweight object (1-5 lb.) from the top shelf of a closet - No difficulty  Place a can of soup (1 lb.) on a shelf overhead - No difficulty  Push open a heavy door - No difficulty  Carry a shopping bag or briefcase - No difficulty  Move a heavy skillet (eg, cast iron skillet) from one stove burner to another - No difficulty  Reach and pull the string that controls a light or fan - No difficulty  Adjusting the back of your collar - No difficulty  Throw a ball underhand - No difficulty       PLAN     Plan of care Certification: 9/19/2022 to 11/19/22.     Outpatient  Physical Therapy 1 times weekly for 8 weeks to include the following interventions: Electrical Stimulation PRN, Manual Therapy, Moist Heat/ Ice, Neuromuscular Re-ed, Patient Education, Self Care, Therapeutic Activities, and Therapeutic Exercise.    Monitor response to today's treatment and progress with Physical Therapy plan of care as indicated.    Inge Corea, PT

## 2022-10-07 DIAGNOSIS — J40 BRONCHITIS: Primary | ICD-10-CM

## 2022-10-07 RX ORDER — AZITHROMYCIN 250 MG/1
TABLET, FILM COATED ORAL
Qty: 6 TABLET | Refills: 0 | Status: SHIPPED | OUTPATIENT
Start: 2022-10-07 | End: 2022-10-12

## 2022-10-07 RX ORDER — BENZONATATE 200 MG/1
200 CAPSULE ORAL 3 TIMES DAILY PRN
Qty: 30 CAPSULE | Refills: 0 | Status: SHIPPED | OUTPATIENT
Start: 2022-10-07 | End: 2022-10-17

## 2022-10-11 ENCOUNTER — CLINICAL SUPPORT (OUTPATIENT)
Dept: REHABILITATION | Facility: HOSPITAL | Age: 52
End: 2022-10-11
Payer: MEDICAID

## 2022-10-11 DIAGNOSIS — R68.89 DECREASED FUNCTIONAL ACTIVITY TOLERANCE: ICD-10-CM

## 2022-10-11 DIAGNOSIS — M25.60 DECREASED RANGE OF MOTION: Primary | ICD-10-CM

## 2022-10-11 DIAGNOSIS — R53.1 DECREASED STRENGTH: ICD-10-CM

## 2022-10-11 PROCEDURE — 97110 THERAPEUTIC EXERCISES: CPT

## 2022-10-11 NOTE — PROGRESS NOTES
"  [facility]   Physical Therapy Treatment Note     Name: Chandra HOOD Stone  Clinic Number: [id number]    Therapy Diagnosis:   Encounter Diagnoses   Name Primary?    Decreased range of motion Yes    Decreased strength     Decreased functional activity tolerance          Physician: Anne-Marie Rondon MD    Visit Date: 10/11/2022    Physician Orders: PT Eval and Treat   Medical Diagnosis from Referral: Calcific tendinitis of right shoulder  Evaluation Date: 9/19/2022  Authorization Period Expiration: 8/22/23  Plan of Care Expiration: 11/19/22  Progress Note Due: 10/19/22  Visit # / Visits authorized: 3/20 (+ 1)  FOTO: 1/3   PTA Visit #: 0/5      Precautions: blood and bodily fluid, Immunosuppression, and HIV+      Time In: 8:15 am  Time Out:9:00 am  Total Billable Time: 38 minutes   Billing reflects [place] medicaid guidelines, billing all therapy as therapeutic-exercise     SUBJECTIVE     Patient reports: no pain, did well after last treatment session. Reports some pain with arm in "wrong position" at times, he is worried that cortisone shot is wearing off.  Improved ROM and decreased pain post treatment.  During treatment session after wall push ups patient reports episode of lightheadedness, better after water and resting and able to continue with treatment session.    He was compliant with home exercise program.    Response to previous treatment: felt fine after the evaluation    Functional change: no limitations since his injection    Pain: 0/10     Location: right shoulder (no pain today)    OBJECTIVE     Objective Measures updated at progress report unless specified.       TREATMENT     Chandra received the treatments listed below:     THERAPEUTIC EXERCISES to develop strength, endurance, ROM, flexibility, posture, and core stabilization for (30) minutes including:    Intervention Performed Today    UBE  x 3'/3' minutes forward/backwards    Scapula retraction x 2 x 10  standing; Green " band   Shoulder extension x 2 x 10 prone   rows x 2 x 10 prone, #3   Prone W's x 10x/ 2 sets   1/2 foam roll series x  x  x  x  x 3 min pectoral stretch  Swimmers 10x  Hugs 10x  [place] 10x  Serratus punch 10x   Codman's   2 minutes each forward, lateral, and counter clockwise and clockwise       Bilateral shoulder External rotation  x 2 x 10 green band   Serratus anterior   3 x 8 bilateral supine   Sitting with good posture     Wall push ups x 10x   Shoulder rolls BW  x 10x   Spidermans at wall x 5x, no band     Plan for Next Visit:        Chandra received the following manual therapy techniques: Joint mobilizations, Myofacial release, and Soft tissue Mobilization were applied to the: right upper quadrant for 8 minutes, including:    Manual Intervention 10/11/2022     Soft Tissue Mobilization x right upper trapezius, levator scapulae , periscapular musculature, rhomboids , latissimus dorsi , pectorals, teres major and minor , subscapularis , and deltoid   Joint Mobilizations     Mobilization with movement x Subscapularis release        Functional Dry Needling            PATIENT EDUCATION AND HOME EXERCISES     Home Exercises Provided and Patient Education Provided     Education provided: (during session) minutes  PURPOSE: Patient educated on the impairments noted above and the effects of physical therapy intervention to improve overall condition and QOL.   EXERCISE: Patient was educated on all the above exercise prior/during/after for proper posture, positioning, and execution for safe performance with home exercise program.   STRENGTH: Patient educated on the importance of improved core and extremity strength in order to improve alignment of the spine and extremities with static positions and dynamic movement.     Written Home Exercises Provided: patient was instructed to continue with prior home exercise program.  Exercises were reviewed and Chandra was able to demonstrate them prior to the end of the session.  Chandra  demonstrated good  understanding of the education provided. See EMR under Patient Instructions for exercises provided during therapy sessions.      ASSESSMENT     Patient continues to report decreased pain levels with improved ROM, he is still presenting with decreased IR functional reach with pain complaint as well.  Patient was encouraged to participate with home exercise program.      Chandra is progressing well towards his goals.   Pt prognosis is Good.     Pt will continue to benefit from skilled outpatient physical therapy to address the deficits listed in the problem list box on initial evaluation, provide pt/family education and to maximize pt's level of independence in the home and community environment.     Pt's spiritual, cultural and educational needs considered and pt agreeable to plan of care and goals.     Anticipated Barriers for therapy: Back pain    Goals:  Reviewed: 10/11/2022    Short Term Goals: In 4 weeks   1.Patient  to be educated on home exercise program.  2.Patient to increase shoulder AROM to equal left , in order to improve function of involved UE.  3.Patient to increase strength by 1/2 grade, in order to improve endurance with activity.  4.Patient to report pain at 0/10 at worst, in order to improve QOL.  5.Patient to improve score on the FOTO, in order to improve QOL.   6. Patient  to be educated on postural and body mechanics awareness.     Long Term Goals: In 8 weeks  1. Patient to improve score on the FOTO to 31% or less, in order to improve QOL.  2. Patient to demonstrate increase in UE strength to 4+/5, in order to improve endurance with activity.  3. Patient to perform daily activities including:              Carry a heavy object (over 10 lbs) - No difficulty  Pull a medium weight object (5-10 lbs.) from under a bed - No difficulty  Lower a lightweight object (1-5 lb.) from the top shelf of a closet - No difficulty  Place a can of soup (1 lb.) on a shelf overhead - No  difficulty  Push open a heavy door - No difficulty  Carry a shopping bag or briefcase - No difficulty  Move a heavy skillet (eg, cast iron skillet) from one stove burner to another - No difficulty  Reach and pull the string that controls a light or fan - No difficulty  Adjusting the back of your collar - No difficulty  Throw a ball underhand - No difficulty       PLAN     Plan of care Certification: 9/19/2022 to 11/19/22.     Outpatient Physical Therapy 1 times weekly for 8 weeks to include the following interventions: Electrical Stimulation PRN, Manual Therapy, Moist Heat/ Ice, Neuromuscular Re-ed, Patient Education, Self Care, Therapeutic Activities, and Therapeutic Exercise.    Monitor response to today's treatment and progress with Physical Therapy plan of care as indicated.    Inge Corea, PT

## 2022-10-17 ENCOUNTER — CLINICAL SUPPORT (OUTPATIENT)
Dept: REHABILITATION | Facility: HOSPITAL | Age: 52
End: 2022-10-17
Payer: MEDICAID

## 2022-10-17 DIAGNOSIS — R53.1 DECREASED STRENGTH: ICD-10-CM

## 2022-10-17 DIAGNOSIS — R68.89 DECREASED FUNCTIONAL ACTIVITY TOLERANCE: ICD-10-CM

## 2022-10-17 DIAGNOSIS — M25.60 DECREASED RANGE OF MOTION: Primary | ICD-10-CM

## 2022-10-17 PROCEDURE — 97110 THERAPEUTIC EXERCISES: CPT

## 2022-10-17 NOTE — PROGRESS NOTES
[facility]   Physical Therapy Treatment Note     Name: Chandra HOOD [facility] Number: [id number]    Therapy Diagnosis:   Encounter Diagnoses   Name Primary?    Decreased range of motion Yes    Decreased strength     Decreased functional activity tolerance          Physician: Anne-Marie Rondon MD    Visit Date: 10/17/2022    Physician Orders: PT Eval and Treat   Medical Diagnosis from Referral: Calcific tendinitis of right shoulder  Evaluation Date: 9/19/2022  Authorization Period Expiration: 8/22/23  Plan of Care Expiration: 11/19/22  Progress Note Due: 10/19/22  Visit # / Visits authorized: 4/20 (+ 1)  FOTO: 2t/3   PTA Visit #: 0/5      Precautions: blood and bodily fluid, Immunosuppression, and HIV+      Time In: 4:45 pm  Time Out: 5:30 pm  Total Billable Time: 38 minutes   Billing reflects [place] medicaid guidelines, billing all therapy as therapeutic-exercise     SUBJECTIVE     Patient reports: no pain,     He was compliant with home exercise program.    Response to previous treatment: felt fine after the evaluation    Functional change: no limitations since his injection    Pain: 0/10     Location: right shoulder (no pain today)    OBJECTIVE     Objective Measures updated at progress report unless specified.       TREATMENT     Chandra received the treatments listed below:     THERAPEUTIC EXERCISES to develop strength, endurance, ROM, flexibility, posture, and core stabilization for (38) minutes including:    Intervention Performed Today    UBE  x 3'/3' minutes forward/backwards    Scapula retraction x 2 x 10  standing; Blue band   Shoulder extension x 3 x 10 prone   rows x 2 x 10 prone, #3   Prone W's x 10x/ 2 sets   1/2 foam roll series x  x  x  x  x 3 min pectoral stretch  Swimmers 10x  Hugs 10x  [place] 10x  Serratus punch 10x   Codman's   2 minutes each forward, lateral, and counter clockwise and clockwise       Bilateral shoulder External rotation  x 2 x 10 blue band    Serratus anterior   3 x 8 bilateral supine   Sitting with good posture     Wall push ups x 10x   Shoulder rolls BW   10x   Spidermans at wall x 6x, red band   Re-assessment x      Plan for Next Visit:        Chandra received the following manual therapy techniques: Joint mobilizations, Myofacial release, and Soft tissue Mobilization were applied to the: right upper quadrant for 0 minutes, including:    Manual Intervention 10/17/2022     Soft Tissue Mobilization  right upper trapezius, levator scapulae , periscapular musculature, rhomboids , latissimus dorsi , pectorals, teres major and minor , subscapularis , and deltoid   Joint Mobilizations     Mobilization with movement  Subscapularis release        Functional Dry Needling            PATIENT EDUCATION AND HOME EXERCISES     Home Exercises Provided and Patient Education Provided     Education provided: (during session) minutes  PURPOSE: Patient educated on the impairments noted above and the effects of physical therapy intervention to improve overall condition and QOL.   EXERCISE: Patient was educated on all the above exercise prior/during/after for proper posture, positioning, and execution for safe performance with home exercise program.   STRENGTH: Patient educated on the importance of improved core and extremity strength in order to improve alignment of the spine and extremities with static positions and dynamic movement.     Written Home Exercises Provided: patient was instructed to continue with prior home exercise program.  Exercises were reviewed and Chandra was able to demonstrate them prior to the end of the session.  Chandra demonstrated good  understanding of the education provided. See EMR under Patient Instructions for exercises provided during therapy sessions.      ASSESSMENT     Patient continues to report decreased pain levels with improved ROM, he is still presenting with decreased IR functional reach with pain complaint as well.  Patient was  encouraged to participate with home exercise program.      Chandra is progressing well towards his goals.   Pt prognosis is Good.     Pt will continue to benefit from skilled outpatient physical therapy to address the deficits listed in the problem list box on initial evaluation, provide pt/family education and to maximize pt's level of independence in the home and community environment.     Pt's spiritual, cultural and educational needs considered and pt agreeable to plan of care and goals.     Anticipated Barriers for therapy: Back pain    Goals:  Reviewed: 10/17/2022     See DC summary      PLAN     See DC summary    Inge Corea, PT

## 2022-10-17 NOTE — PLAN OF CARE
[facility]  Physical Therapy Discharge Note    Name: Chandra Stone  [facility] Number: [id number]    Therapy Diagnosis:   Encounter Diagnoses   Name Primary?    Decreased range of motion Yes    Decreased strength     Decreased functional activity tolerance      Physician: Anne-Marie Rondon MD    Physician Orders: PT Eval and Treat   Medical Diagnosis from Referral: Calcific tendinitis of right shoulder  Evaluation Date: 9/19/2022      Date of Last visit: 10/17/22  Total Visits Received: 5    ASSESSMENT                     ROM:  Shoulder  AROM/PROM Right  evaluation Left  evaluation Pain/Dysfunction with Movement 10/17/22  Right AROM/PROM   flexion  140 / pain at end range  180/WNL   180/ WNL   extension  50 / NT  48/WNL   50/ NT   abduction  120 / 110  170/WNL Pain right  170/ WNL   Internal rotation  T/L junction / WFL  L3/WNL Uncomfortable on right   T/L junction/ WNL   ER  T4 / 60  T2/WNL Painful on right  T4/ WNL      Strength:  U/E MMT Right  evaluation Left  evaluation Pain/Dysfunction with Movement 10/17/22  Bilateral    Cervical SB 4/5 4/5   5/5   Upper Trapezial 4/5 4/5   5/5   Shoulder Flexion 4/5 4/5   5/5   Shoulder Abduction 4/5 4/5   5/5   Elbow Flexion (C5)  4/5 4/5   5/5   Elbow Extension (C7) 4/5 4/5   5/5   Shoulder ER  @ 0* Abduction 4/5 4/5   5/5   Shoulder IR  @ 0* Abduction 4/5 4/5   5/5   Wrist extension (C6) 4+/5 4+/5   5/5    Strength  4+/5 4+/5   5/5   Intrinsic strength (4th/5th fingers) 3+/5 4/5   NT   Serratus Anterior 4-/5 4-/5   4+/5   Supraspevontus NT pain 4-/5 Pain with test position 4+/5 no pain   Mid Traps NT pain 3+/5 Pain with test position 4/5 no pain   Low Traps 3/5 3+/5   4/5 no pain      FOTO:      Discharge reason: Patient is now asymptomatic and Patient has met all of his/her goals    Discharge FOTO Score: 30%    Goals: Short Term Goals: In 4 weeks   1.Patient  to be educated on home exercise program. MET 10/17/22  2.Patient to increase shoulder  AROM to equal left , in order to improve function of involved UE. MET 10/17/22  3.Patient to increase strength by 1/2 grade, in order to improve endurance with activity. MET 10/17/22  4.Patient to report pain at 0/10 at worst, in order to improve QOL. MET 10/17/22  5.Patient to improve score on the FOTO, in order to improve QOL.  MET 10/17/22  6. Patient  to be educated on postural and body mechanics awareness. MET 10/17/22     Long Term Goals: In 8 weeks  1. Patient to improve score on the FOTO to 31% or less, in order to improve QOL. MET 10/17/22  2. Patient to demonstrate increase in UE strength to 4+/5, in order to improve endurance with activity. MET 10/17/22  3. Patient to perform daily activities including: MET 10/17/22              Carry a heavy object (over 10 lbs) - No difficulty  Pull a medium weight object (5-10 lbs.) from under a bed - No difficulty  Lower a lightweight object (1-5 lb.) from the top shelf of a closet - No difficulty  Place a can of soup (1 lb.) on a shelf overhead - No difficulty  Push open a heavy door - No difficulty  Carry a shopping bag or briefcase - No difficulty  Move a heavy skillet (eg, cast iron skillet) from one stove burner to another - No difficulty  Reach and pull the string that controls a light or fan - No difficulty  Adjusting the back of your collar - No difficulty  Throw a ball underhand - No difficulty       PLAN   This patient is discharged from Physical Therapy      Inge Corea PT

## 2022-10-29 ENCOUNTER — IMMUNIZATION (OUTPATIENT)
Dept: PRIMARY CARE CLINIC | Facility: CLINIC | Age: 52
End: 2022-10-29
Payer: MEDICAID

## 2022-10-29 DIAGNOSIS — Z23 NEED FOR IMMUNIZATION AGAINST TETANUS ALONE: Primary | ICD-10-CM

## 2022-10-29 NOTE — PROGRESS NOTES
Flu 0.5 ml given IM in left deltoid  Lot # [id number]      Exp: 06/30/23  Manufactory: [employer]  NDC #: 08798-393-67      Pt waited 15 minutes without a reaction notices

## 2022-12-09 ENCOUNTER — HOSPITAL ENCOUNTER (OUTPATIENT)
Dept: RADIOLOGY | Facility: HOSPITAL | Age: 52
Discharge: HOME | End: 2022-12-09
Attending: NURSE PRACTITIONER
Payer: MEDICAID

## 2022-12-09 DIAGNOSIS — R05.9 COUGH, UNSPECIFIED TYPE: ICD-10-CM

## 2022-12-09 DIAGNOSIS — R05.9 COUGH, UNSPECIFIED TYPE: Primary | ICD-10-CM

## 2022-12-09 PROCEDURE — 71046 X-RAY EXAM CHEST 2 VIEWS: CPT | Mod: 26,,, | Performed by: RADIOLOGY

## 2022-12-09 PROCEDURE — 71046 XR CHEST PA AND LATERAL: ICD-10-PCS | Mod: 26,,, | Performed by: RADIOLOGY

## 2022-12-09 PROCEDURE — 71046 X-RAY EXAM CHEST 2 VIEWS: CPT | Mod: TC,PN

## 2022-12-13 ENCOUNTER — CLINICAL SUPPORT (OUTPATIENT)
Dept: PRIMARY CARE CLINIC | Facility: CLINIC | Age: 52
End: 2022-12-13
Payer: MEDICAID

## 2022-12-13 DIAGNOSIS — Z20.822 SUSPECTED COVID-19 VIRUS INFECTION: ICD-10-CM

## 2022-12-13 DIAGNOSIS — Z20.822 SUSPECTED COVID-19 VIRUS INFECTION: Primary | ICD-10-CM

## 2022-12-13 DIAGNOSIS — R68.89 FLU-LIKE SYMPTOMS: ICD-10-CM

## 2022-12-13 DIAGNOSIS — R68.89 FLU-LIKE SYMPTOMS: Primary | ICD-10-CM

## 2022-12-13 LAB
CTP QC/QA: YES
POC MOLECULAR INFLUENZA A AGN: NEGATIVE
POC MOLECULAR INFLUENZA B AGN: NEGATIVE
RSV RAPID ANTIGEN: NEGATIVE
SARS-COV-2 RDRP RESP QL NAA+PROBE: NEGATIVE

## 2022-12-13 PROCEDURE — 87807 RSV ASSAY W/OPTIC: CPT | Mod: PBBFAC,PN

## 2022-12-13 PROCEDURE — 87502 INFLUENZA DNA AMP PROBE: CPT | Mod: PBBFAC,PN

## 2022-12-13 PROCEDURE — 87635 SARS-COV-2 COVID-19 AMP PRB: CPT | Mod: PBBFAC,PN

## 2022-12-13 RX ORDER — PROMETHAZINE HYDROCHLORIDE AND DEXTROMETHORPHAN HYDROBROMIDE 6.25; 15 MG/5ML; MG/5ML
5 SYRUP ORAL EVERY 6 HOURS PRN
Qty: 118 ML | Refills: 0 | Status: SHIPPED | OUTPATIENT
Start: 2022-12-13 | End: 2022-12-23

## 2022-12-13 NOTE — PROGRESS NOTES
Patient presented with flu like symptoms Provider want to r/o covid, flu and rsv viruses patient was informed of results

## 2023-02-10 DIAGNOSIS — Z13.220 LIPID SCREENING: ICD-10-CM

## 2023-02-10 DIAGNOSIS — Z21 ASYMPTOMATIC HIV INFECTION: Primary | ICD-10-CM

## 2023-02-10 DIAGNOSIS — Z13.1 SCREENING FOR DIABETES MELLITUS: ICD-10-CM

## 2023-02-11 ENCOUNTER — LAB VISIT (OUTPATIENT)
Dept: LAB | Facility: HOSPITAL | Age: 53
End: 2023-02-11
Attending: FAMILY MEDICINE
Payer: MEDICAID

## 2023-02-11 DIAGNOSIS — Z13.220 LIPID SCREENING: ICD-10-CM

## 2023-02-11 DIAGNOSIS — Z21 ASYMPTOMATIC HIV INFECTION: ICD-10-CM

## 2023-02-11 DIAGNOSIS — Z13.1 SCREENING FOR DIABETES MELLITUS: ICD-10-CM

## 2023-02-11 LAB
CHOLEST SERPL-MCNC: 203 MG/DL (ref 120–199)
CHOLEST/HDLC SERPL: 4.3 {RATIO} (ref 2–5)
ESTIMATED AVG GLUCOSE: 105 MG/DL (ref 68–131)
HBA1C MFR BLD: 5.3 % (ref 4–5.6)
HDLC SERPL-MCNC: 47 MG/DL (ref 40–75)
HDLC SERPL: 23.2 % (ref 20–50)
LDLC SERPL CALC-MCNC: 120.8 MG/DL (ref 63–159)
NONHDLC SERPL-MCNC: 156 MG/DL
TRIGL SERPL-MCNC: 176 MG/DL (ref 30–150)

## 2023-02-11 PROCEDURE — 83036 HEMOGLOBIN GLYCOSYLATED A1C: CPT | Performed by: FAMILY MEDICINE

## 2023-02-11 PROCEDURE — 80061 LIPID PANEL: CPT | Performed by: FAMILY MEDICINE

## 2023-02-11 PROCEDURE — 36415 COLL VENOUS BLD VENIPUNCTURE: CPT | Performed by: FAMILY MEDICINE

## 2023-02-11 PROCEDURE — 86361 T CELL ABSOLUTE COUNT: CPT | Performed by: FAMILY MEDICINE

## 2023-02-12 NOTE — PROGRESS NOTES
Your A1c is normal and you do not have diabetes.  Your cholesterol is high but based on your calculated risk, you do not need to be on medication.  You could however take omega 3 FA and adhere strictly to your diet in order to avoid complications of high cholesterol which includes heart attack, stroke, kidney failure requiring dialysis, peripheral vascular disease, loss of limb and premature death.  Also, Maintain/Continue a heathy lifestyle.  Choose a diet low in red meat and avoid all fried foods and saturated fat in sweets.  Be more active. If you are able, walk for 35 mins 5 times a week.      The 10-year ASCVD risk score (Mathieu HARRIS, et al., 2019) is: 6.9%    Values used to calculate the score:      Age: 53 years      Sex: Male      Is Non- : Yes      Diabetic: No      Tobacco smoker: No      Systolic Blood Pressure: 136 mmHg      Is BP treated: No      HDL Cholesterol: 47 mg/dL      Total Cholesterol: 203 mg/dL   As always, we are here to help you  achieve your health goals but we cannot do it without your cooperation. Let us know how we can help you.

## 2023-02-13 LAB
CD3+CD4+ CELLS # BLD: 700 CELLS/UL (ref 300–1400)
CD3+CD4+ CELLS NFR BLD: 29.5 % (ref 28–57)

## 2023-02-14 DIAGNOSIS — Z21 ASYMPTOMATIC HIV INFECTION: Primary | ICD-10-CM

## 2023-02-18 ENCOUNTER — LAB VISIT (OUTPATIENT)
Dept: LAB | Facility: HOSPITAL | Age: 53
End: 2023-02-18
Attending: FAMILY MEDICINE
Payer: MEDICAID

## 2023-02-18 DIAGNOSIS — Z21 ASYMPTOMATIC HIV INFECTION: ICD-10-CM

## 2023-02-18 PROCEDURE — 87536 HIV-1 QUANT&REVRSE TRNSCRPJ: CPT | Performed by: FAMILY MEDICINE

## 2023-02-18 PROCEDURE — 36415 COLL VENOUS BLD VENIPUNCTURE: CPT | Performed by: FAMILY MEDICINE

## 2023-02-20 LAB
HIV1 RNA # SERPL NAA+PROBE: NOT DETECTED COPIES/ML
HIV1 RNA SERPL QL NAA+PROBE: NOT DETECTED

## 2023-02-21 ENCOUNTER — OFFICE VISIT (OUTPATIENT)
Dept: PRIMARY CARE CLINIC | Facility: CLINIC | Age: 53
End: 2023-02-21
Payer: MEDICAID

## 2023-02-21 ENCOUNTER — HOSPITAL ENCOUNTER (OUTPATIENT)
Dept: RADIOLOGY | Facility: HOSPITAL | Age: 53
Discharge: HOME | End: 2023-02-21
Attending: FAMILY MEDICINE
Payer: MEDICAID

## 2023-02-21 DIAGNOSIS — R07.89 OTHER CHEST PAIN: Primary | ICD-10-CM

## 2023-02-21 DIAGNOSIS — R07.89 OTHER CHEST PAIN: ICD-10-CM

## 2023-02-21 DIAGNOSIS — M79.18 MYOFASCIAL MUSCLE PAIN: Primary | ICD-10-CM

## 2023-02-21 PROCEDURE — 71046 X-RAY EXAM CHEST 2 VIEWS: CPT | Mod: 26,,, | Performed by: RADIOLOGY

## 2023-02-21 PROCEDURE — 3044F PR MOST RECENT HEMOGLOBIN A1C LEVEL <7.0%: ICD-10-PCS | Mod: CPTII,,, | Performed by: FAMILY MEDICINE

## 2023-02-21 PROCEDURE — 99214 OFFICE O/P EST MOD 30 MIN: CPT | Mod: S$PBB,25,, | Performed by: FAMILY MEDICINE

## 2023-02-21 PROCEDURE — 71046 X-RAY EXAM CHEST 2 VIEWS: CPT | Mod: TC,PN

## 2023-02-21 PROCEDURE — 20552 NJX 1/MLT TRIGGER POINT 1/2: CPT | Mod: PBBFAC,PN | Performed by: FAMILY MEDICINE

## 2023-02-21 PROCEDURE — 20552 TRIGGER POINT INJECTION: ICD-10-PCS | Mod: S$PBB,,, | Performed by: FAMILY MEDICINE

## 2023-02-21 PROCEDURE — 99214 PR OFFICE/OUTPT VISIT, EST, LEVL IV, 30-39 MIN: ICD-10-PCS | Mod: S$PBB,25,, | Performed by: FAMILY MEDICINE

## 2023-02-21 PROCEDURE — 71046 XR CHEST PA AND LATERAL: ICD-10-PCS | Mod: 26,,, | Performed by: RADIOLOGY

## 2023-02-21 PROCEDURE — 3044F HG A1C LEVEL LT 7.0%: CPT | Mod: CPTII,,, | Performed by: FAMILY MEDICINE

## 2023-02-21 RX ORDER — LIDOCAINE HYDROCHLORIDE 10 MG/ML
1 INJECTION INFILTRATION; PERINEURAL
Status: DISCONTINUED | OUTPATIENT
Start: 2023-02-21 | End: 2023-02-21 | Stop reason: HOSPADM

## 2023-02-21 RX ORDER — TRIAMCINOLONE ACETONIDE 40 MG/ML
20 INJECTION, SUSPENSION INTRA-ARTICULAR; INTRAMUSCULAR
Status: DISCONTINUED | OUTPATIENT
Start: 2023-02-21 | End: 2023-02-21 | Stop reason: HOSPADM

## 2023-02-21 RX ADMIN — LIDOCAINE HYDROCHLORIDE 1 ML: 10 INJECTION, SOLUTION INFILTRATION; PERINEURAL at 02:02

## 2023-02-21 RX ADMIN — TRIAMCINOLONE ACETONIDE 20 MG: 40 INJECTION, SUSPENSION INTRA-ARTICULAR; INTRAMUSCULAR at 02:02

## 2023-02-21 NOTE — PROCEDURES
Trigger Point Injection  Performed by: Anne-Marie Rondon MD  Authorized by: Anne-Marie Rondon MD     Trapezus:  Right    Consent Done?:  Yes (Written)    Pre-Procedure:   Indications:  Pain  Site marked: the procedure site was marked     Timeout: prior to procedure the correct patient, procedure, and site was verified    Prep: patient was prepped and draped in usual sterile fashion     Local anesthesia used?: Yes    Local anesthetic:  Lidocaine spray  Medications: 1 mL LIDOcaine HCL 10 mg/ml (1%) 10 mg/mL (1 %); 20 mg triamcinolone acetonide 40 mg/mL  Sternocleidomastoid:  Right    Rest, Ice, and Compression for 24 hours.  Watch for any redness, swelling, or pain as potential signs of infections.  Return to clinic if any fever or suspicion of infection  Gradual return to normal activity.

## 2023-02-21 NOTE — PROGRESS NOTES
Subjective:       Patient ID: Chandra Stone is a 53 y.o. male.    Chief Complaint:Muscle pain        History of Present Illness:   Chandra Stone 53 y.o. male presents today with   New complaint  Pain to the right side of the neck, top of shoulder and right side of the chest  and the trap musclex 3 days.  Gradual onset and worsening.  Ache and soreness, tension, pressure helps.  Tried NSAID and topical and it keeps coming back.       X-Ray Chest PA And Lateral  Narrative: EXAM:  XR CHEST PA AND LATERAL    CLINICAL HISTORY: Chest pain    COMPARISON: 12/09/2022    FINDINGS: The size and contour of the heart are normal. The lungs are clear. There is no pneumothorax or pleural effusion.  Impression:  Normal study.    Finalized on: 2/21/2023 10:51 AM By:  Jefe Avelar MD  [facility]# [id number]      2023-02-21 10:53:40.015    [facility]     Past Medical History:   Diagnosis Date    HIV (human immunodeficiency virus infection)      Family History   Problem Relation Age of Onset    Hypertension Mother     Diabetes Mother     Diabetes Brother     Hypertension Brother      Social History     Socioeconomic History    Marital status: Single   Tobacco Use    Smoking status: Former     Years: 10.00     Types: Cigarettes    Smokeless tobacco: Former    Tobacco comments:     quit over 5 years ago    Substance and Sexual Activity    Alcohol use: Yes     Comment: 3/week     Drug use: Never     Outpatient Encounter Medications as of 2/21/2023   Medication Sig Dispense Refill    aspirin (ECOTRIN) 81 MG EC tablet Take 1 tablet (81 mg total) by mouth once daily. 30 tablet 11    dpggvzbmp-wkfaeteq-zyjirzl ala (BIKTARVY) -25 mg per tablet Take 1 tablet by mouth once daily.      diclofenac (VOLTAREN) 75 MG EC tablet Take 1 tablet (75 mg total) by mouth 2 (two) times daily as needed (back pain). 30 tablet 2    tamsulosin (FLOMAX) 0.4 mg Cap Take 1 capsule (0.4 mg total) by mouth once daily. for 7 days 7 capsule 0     Facility-Administered  Encounter Medications as of 2/21/2023   Medication Dose Route Frequency Provider Last Rate Last Admin    LIDOcaine HCL 10 mg/ml (1%) injection 1 mL  1 mL   Anne-Marie Rondon MD   1 mL at 02/21/23 1443    triamcinolone acetonide injection 20 mg  20 mg Intra-articular  Anne-Marie Rondon MD   20 mg at 02/21/23 1443       Review of Systems    Objective:      There were no vitals taken for this visit.  Physical Exam  Musculoskeletal:        Arms:       Cervical back: No tenderness.        Back:       Comments: Top of shoulder and Trap muscle       Results for orders placed or performed in visit on 02/18/23   HIV RNA, QUANTITATIVE, PCR   Result Value Ref Range    HIV-1 ULTRAQUANT Not Detected <20 Copies/mL    HIV-1 RNA, QUALITATIVE Not Detected Not Detected     Assessment:       1. Myofascial muscle pain          Plan:   Myofascial muscle pain  -     Trigger Point Injection  -     LIDOcaine HCL 10 mg/ml (1%) injection 1 mL  -     triamcinolone acetonide injection 20 mg    Treatment options and alternatives were discussed with the patient. Patient was given ample time to ask questions. All questions were answered. Voices understanding and acceptance of this advice. Will call back if any further questions or concerns.    Anne-Marie Rondon MD

## 2023-04-04 DIAGNOSIS — R07.89 OTHER CHEST PAIN: Primary | ICD-10-CM

## 2023-04-25 DIAGNOSIS — M10.9 ACUTE GOUT OF RIGHT WRIST, UNSPECIFIED CAUSE: Primary | ICD-10-CM

## 2023-04-25 RX ORDER — COLCHICINE 0.6 MG/1
0.6 TABLET ORAL 2 TIMES DAILY
Qty: 20 TABLET | Refills: 0 | Status: SHIPPED | OUTPATIENT
Start: 2023-04-25 | End: 2023-05-05

## 2023-05-01 DIAGNOSIS — G89.29 CHRONIC RIGHT SHOULDER PAIN: Primary | ICD-10-CM

## 2023-05-01 DIAGNOSIS — M25.511 CHRONIC RIGHT SHOULDER PAIN: Primary | ICD-10-CM

## 2023-05-17 ENCOUNTER — PATIENT OUTREACH (OUTPATIENT)
Dept: ADMINISTRATIVE | Facility: HOSPITAL | Age: 53
End: 2023-05-17

## 2023-05-17 DIAGNOSIS — Z12.11 SCREENING FOR COLORECTAL CANCER: Primary | ICD-10-CM

## 2023-05-17 DIAGNOSIS — Z12.12 SCREENING FOR COLORECTAL CANCER: Primary | ICD-10-CM

## 2023-05-17 DIAGNOSIS — Z12.11 ENCOUNTER FOR COLORECTAL CANCER SCREENING: ICD-10-CM

## 2023-05-17 DIAGNOSIS — Z12.12 ENCOUNTER FOR COLORECTAL CANCER SCREENING: ICD-10-CM

## 2023-07-26 ENCOUNTER — HOSPITAL ENCOUNTER (OUTPATIENT)
Dept: RADIOLOGY | Facility: HOSPITAL | Age: 53
Discharge: HOME | End: 2023-07-26
Attending: FAMILY MEDICINE
Payer: MEDICAID

## 2023-07-26 ENCOUNTER — DOCUMENTATION ONLY (OUTPATIENT)
Dept: PRIMARY CARE CLINIC | Facility: CLINIC | Age: 53
End: 2023-07-26

## 2023-07-26 DIAGNOSIS — R05.3 CHRONIC COUGH: Primary | ICD-10-CM

## 2023-07-26 DIAGNOSIS — R05.3 CHRONIC COUGH: ICD-10-CM

## 2023-07-26 NOTE — PROGRESS NOTES
Subjective:       Patient ID: Chandra Stone is a 53 y.o. male.    Chief Complaint: No chief complaint on file.    Documentation only  History of Present Illness:   Chandra Stone 53 y.o. male presents today with    Cough x 3 months. Dry and now keeping him up at night.  He has been on oral abx per another provider and it did not help.  Denies acid reflux and asthma.    POC: will get an xray and go from there.     Past Medical History:   Diagnosis Date    HIV (human immunodeficiency virus infection)      Family History   Problem Relation Age of Onset    Hypertension Mother     Diabetes Mother     Diabetes Brother     Hypertension Brother      Social History     Socioeconomic History    Marital status: Single   Tobacco Use    Smoking status: Former     Years: 10.00     Types: Cigarettes    Smokeless tobacco: Former    Tobacco comments:     quit over 5 years ago    Substance and Sexual Activity    Alcohol use: Yes     Comment: 3/week     Drug use: Never     Outpatient Encounter Medications as of 7/26/2023   Medication Sig Dispense Refill    aspirin (ECOTRIN) 81 MG EC tablet Take 1 tablet (81 mg total) by mouth once daily. 30 tablet 11    ydylubnir-bupnfxuq-nmszqxd ala (BIKTARVY) -25 mg per tablet Take 1 tablet by mouth once daily.      colchicine (COLCRYS) 0.6 mg tablet Take 1 tablet (0.6 mg total) by mouth 2 (two) times daily. for 10 days 20 tablet 0    diclofenac (VOLTAREN) 75 MG EC tablet Take 1 tablet (75 mg total) by mouth 2 (two) times daily as needed (back pain). 30 tablet 2    tamsulosin (FLOMAX) 0.4 mg Cap Take 1 capsule (0.4 mg total) by mouth once daily. for 7 days 7 capsule 0     No facility-administered encounter medications on file as of 7/26/2023.       Review of Systems    Objective:      There were no vitals taken for this visit.  Physical Exam    Results for orders placed or performed in visit on 02/18/23   HIV RNA, QUANTITATIVE, PCR   Result Value Ref Range    HIV-1 ULTRAQUANT Not  Detected <20 Copies/mL    HIV-1 RNA, QUALITATIVE Not Detected Not Detected     Assessment:       No diagnosis found.    Plan:   There are no diagnoses linked to this encounter.    I have reviewed all of the patient's clinical history available in care everywhere and Epic and have utilized this in my evaluation and management recommendations today.      Treatment options and alternatives were discussed with the patient. Patient was given ample time to ask questions. All questions were answered. Voices understanding and acceptance of this advice. Will call back if any further questions or concerns.     Anne-Marie Rondon MD

## 2023-07-27 ENCOUNTER — HOSPITAL ENCOUNTER (OUTPATIENT)
Dept: RADIOLOGY | Facility: HOSPITAL | Age: 53
Discharge: HOME | End: 2023-07-27
Attending: NURSE PRACTITIONER
Payer: MEDICAID

## 2023-07-27 ENCOUNTER — HOSPITAL ENCOUNTER (OUTPATIENT)
Dept: RADIOLOGY | Facility: HOSPITAL | Age: 53
Discharge: HOME | End: 2023-07-27
Attending: FAMILY MEDICINE
Payer: MEDICAID

## 2023-07-27 DIAGNOSIS — R05.9 COUGH, UNSPECIFIED TYPE: ICD-10-CM

## 2023-07-27 DIAGNOSIS — R05.9 COUGH, UNSPECIFIED TYPE: Primary | ICD-10-CM

## 2023-07-27 PROCEDURE — 71046 X-RAY EXAM CHEST 2 VIEWS: CPT | Mod: TC,PN

## 2023-07-27 PROCEDURE — 71046 XR CHEST PA AND LATERAL: ICD-10-PCS | Mod: 26,,, | Performed by: RADIOLOGY

## 2023-07-27 PROCEDURE — 71046 X-RAY EXAM CHEST 2 VIEWS: CPT | Mod: 26,,, | Performed by: RADIOLOGY

## 2023-08-04 ENCOUNTER — OFFICE VISIT (OUTPATIENT)
Dept: PRIMARY CARE CLINIC | Facility: CLINIC | Age: 53
End: 2023-08-04
Payer: MEDICAID

## 2023-08-04 VITALS
WEIGHT: 200 LBS | HEIGHT: 68 IN | BODY MASS INDEX: 30.31 KG/M2 | SYSTOLIC BLOOD PRESSURE: 122 MMHG | OXYGEN SATURATION: 99 % | DIASTOLIC BLOOD PRESSURE: 78 MMHG | HEART RATE: 88 BPM | TEMPERATURE: 98 F

## 2023-08-04 DIAGNOSIS — M75.01 ADHESIVE CAPSULITIS OF RIGHT SHOULDER: Primary | ICD-10-CM

## 2023-08-04 PROCEDURE — 3044F PR MOST RECENT HEMOGLOBIN A1C LEVEL <7.0%: ICD-10-PCS | Mod: CPTII,,, | Performed by: FAMILY MEDICINE

## 2023-08-04 PROCEDURE — 99214 OFFICE O/P EST MOD 30 MIN: CPT | Mod: S$PBB,25,, | Performed by: FAMILY MEDICINE

## 2023-08-04 PROCEDURE — 3074F PR MOST RECENT SYSTOLIC BLOOD PRESSURE < 130 MM HG: ICD-10-PCS | Mod: CPTII,,, | Performed by: FAMILY MEDICINE

## 2023-08-04 PROCEDURE — 3008F PR BODY MASS INDEX (BMI) DOCUMENTED: ICD-10-PCS | Mod: CPTII,,, | Performed by: FAMILY MEDICINE

## 2023-08-04 PROCEDURE — 3044F HG A1C LEVEL LT 7.0%: CPT | Mod: CPTII,,, | Performed by: FAMILY MEDICINE

## 2023-08-04 PROCEDURE — 20610 DRAIN/INJ JOINT/BURSA W/O US: CPT | Mod: PBBFAC,PN | Performed by: FAMILY MEDICINE

## 2023-08-04 PROCEDURE — 99999 PR PBB SHADOW E&M-EST. PATIENT-LVL III: CPT | Mod: PBBFAC,,, | Performed by: FAMILY MEDICINE

## 2023-08-04 PROCEDURE — 3074F SYST BP LT 130 MM HG: CPT | Mod: CPTII,,, | Performed by: FAMILY MEDICINE

## 2023-08-04 PROCEDURE — 3078F DIAST BP <80 MM HG: CPT | Mod: CPTII,,, | Performed by: FAMILY MEDICINE

## 2023-08-04 PROCEDURE — 99213 OFFICE O/P EST LOW 20 MIN: CPT | Mod: PBBFAC,PN | Performed by: FAMILY MEDICINE

## 2023-08-04 PROCEDURE — 1159F MED LIST DOCD IN RCRD: CPT | Mod: CPTII,,, | Performed by: FAMILY MEDICINE

## 2023-08-04 PROCEDURE — 1159F PR MEDICATION LIST DOCUMENTED IN MEDICAL RECORD: ICD-10-PCS | Mod: CPTII,,, | Performed by: FAMILY MEDICINE

## 2023-08-04 PROCEDURE — 20610 LARGE JOINT ASPIRATION/INJECTION: R SUBACROMIAL BURSA: ICD-10-PCS | Mod: S$PBB,RT,, | Performed by: FAMILY MEDICINE

## 2023-08-04 PROCEDURE — 99999 PR PBB SHADOW E&M-EST. PATIENT-LVL III: ICD-10-PCS | Mod: PBBFAC,,, | Performed by: FAMILY MEDICINE

## 2023-08-04 PROCEDURE — 3078F PR MOST RECENT DIASTOLIC BLOOD PRESSURE < 80 MM HG: ICD-10-PCS | Mod: CPTII,,, | Performed by: FAMILY MEDICINE

## 2023-08-04 PROCEDURE — 3008F BODY MASS INDEX DOCD: CPT | Mod: CPTII,,, | Performed by: FAMILY MEDICINE

## 2023-08-04 PROCEDURE — 99999PBSHW PR PBB SHADOW TECHNICAL ONLY FILED TO HB: Mod: PBBFAC,,,

## 2023-08-04 PROCEDURE — 99999PBSHW PR PBB SHADOW TECHNICAL ONLY FILED TO HB: ICD-10-PCS | Mod: PBBFAC,,,

## 2023-08-04 PROCEDURE — 99214 PR OFFICE/OUTPT VISIT, EST, LEVL IV, 30-39 MIN: ICD-10-PCS | Mod: S$PBB,25,, | Performed by: FAMILY MEDICINE

## 2023-08-04 RX ORDER — METHYLPREDNISOLONE ACETATE 40 MG/ML
60 INJECTION, SUSPENSION INTRA-ARTICULAR; INTRALESIONAL; INTRAMUSCULAR; SOFT TISSUE
Status: DISCONTINUED | OUTPATIENT
Start: 2023-08-04 | End: 2023-08-04 | Stop reason: HOSPADM

## 2023-08-04 RX ORDER — HYDROCODONE BITARTRATE AND ACETAMINOPHEN 7.5; 325 MG/1; MG/1
1 TABLET ORAL EVERY 6 HOURS PRN
Qty: 12 TABLET | Refills: 0 | Status: SHIPPED | OUTPATIENT
Start: 2023-08-04 | End: 2023-08-08

## 2023-08-04 RX ORDER — LIDOCAINE HYDROCHLORIDE 20 MG/ML
3 INJECTION, SOLUTION INFILTRATION; PERINEURAL
Status: DISCONTINUED | OUTPATIENT
Start: 2023-08-04 | End: 2023-08-04 | Stop reason: HOSPADM

## 2023-08-04 RX ADMIN — METHYLPREDNISOLONE ACETATE 60 MG: 40 INJECTION, SUSPENSION INTRALESIONAL; INTRAMUSCULAR; INTRASYNOVIAL; SOFT TISSUE at 11:08

## 2023-08-04 RX ADMIN — LIDOCAINE HYDROCHLORIDE 3 MG: 20 INJECTION, SOLUTION INFILTRATION; PERINEURAL at 11:08

## 2023-08-04 NOTE — PROCEDURES
Large Joint Aspiration/Injection: R subacromial bursa    Date/Time: 8/4/2023 11:00 AM    Performed by: Anne-Marie Rondon MD  Authorized by: Anne-Marie Rondon MD    Consent Done?:  Yes (Written)  Indications:  Pain  Site marked: the procedure site was marked    Timeout: prior to procedure the correct patient, procedure, and site was verified      Local anesthesia used?: Yes    Local anesthetic:  Lidocaine spray    Details:  Needle Size:  21 G  Ultrasonic Guidance for needle placement?: No    Approach:  Posterior  Location:  Shoulder  Site:  R subacromial bursa  Medications:  3 mg LIDOcaine HCL 20 mg/ml (2%) 20 mg/mL (2 %); 60 mg methylPREDNISolone acetate 40 mg/mL  Patient tolerance:  Patient tolerated the procedure well with no immediate complications     Rest, Ice, and Compression for 24 hours.  Watch for any redness, swelling, or pain as potential signs of infections.  Return to clinic if any fever or suspicion of infection  Gradual return to normal activity.

## 2023-08-04 NOTE — PROGRESS NOTES
Subjective:       Patient ID: Chandra Stone is a 53 y.o. male.    Chief Complaint: Shoulder pain    History of Present Illness:   Chandra Stone 53 y.o. male presents today with     Subjective:      Chandra Stone is a 53 y.o. male who presents with right shoulder pain. The symptoms began  last night . Aggravating factors: no known event. Pain is located diffusely throughout the shoulder. Discomfort is described as aching and sharp/stabbing. Symptoms are exacerbated by  any movement, unable to move or raise shoulder. He has had this before and injection last for more than 9 mons and he completed PT . Evaluation to date: he had xray previously which showed some tendinitis. Asking for injection.  Review of Systems          A complete 10 point ROS was completed and are positive as per above HPI. Otherwise negative for fever, diplopia, chest pain, shortness of breath, vomiting, blood in urine, skin rash, seizures and unusual bleeding.       Objective:      There were no vitals taken for this visit.  Right shoulder: non-specific diffuse tenderness about the shoulder, tenderness over the glenohumeral joint, radial pulse intact, and unable to perform any manuvres, supporting his arm with the other hand close to his body   Left shoulder: normal active ROM, no tenderness, no impingement sign           Past Medical History:   Diagnosis Date    HIV (human immunodeficiency virus infection)      Family History   Problem Relation Age of Onset    Hypertension Mother     Diabetes Mother     Diabetes Brother     Hypertension Brother      Social History     Socioeconomic History    Marital status: Single   Tobacco Use    Smoking status: Former     Current packs/day: 0.00     Types: Cigarettes    Smokeless tobacco: Former    Tobacco comments:     quit over 5 years ago    Substance and Sexual Activity    Alcohol use: Yes     Comment: 3/week     Drug use: Never     Outpatient Encounter Medications as of 8/4/2023   Medication  Sig Dispense Refill    aspirin (ECOTRIN) 81 MG EC tablet Take 1 tablet (81 mg total) by mouth once daily. 30 tablet 11    mtuswqrvd-ldwlbdny-zwecgeq ala (BIKTARVY) -25 mg per tablet Take 1 tablet by mouth once daily.      colchicine (COLCRYS) 0.6 mg tablet Take 1 tablet (0.6 mg total) by mouth 2 (two) times daily. for 10 days 20 tablet 0    diclofenac (VOLTAREN) 75 MG EC tablet Take 1 tablet (75 mg total) by mouth 2 (two) times daily as needed (back pain). 30 tablet 2    HYDROcodone-acetaminophen (NORCO) 7.5-325 mg per tablet Take 1 tablet by mouth every 6 (six) hours as needed for Pain. 12 tablet 0    tamsulosin (FLOMAX) 0.4 mg Cap Take 1 capsule (0.4 mg total) by mouth once daily. for 7 days 7 capsule 0     Facility-Administered Encounter Medications as of 8/4/2023   Medication Dose Route Frequency Provider Last Rate Last Admin    LIDOcaine HCL 20 mg/ml (2%) injection 3 mg  3 mg   Anne-Marie Rondon MD   3 mg at 08/04/23 1100    methylPREDNISolone acetate injection 60 mg  60 mg Intra-articular  Anne-Marie Rondon MD   60 mg at 08/04/23 1100           1. Adhesive capsulitis of right shoulder        Plan:   Adhesive capsulitis of right shoulder  -     Large Joint Aspiration/Injection: R subacromial bursa  -     LIDOcaine HCL 20 mg/ml (2%) injection 3 mg  -     methylPREDNISolone acetate injection 60 mg  -     HYDROcodone-acetaminophen (NORCO) 7.5-325 mg per tablet; Take 1 tablet by mouth every 6 (six) hours as needed for Pain.  Dispense: 12 tablet; Refill: 0        I have reviewed all of the patient's clinical history available in care everywhere and Epic and have utilized this in my evaluation and management recommendations today.      Treatment options and alternatives were discussed with the patient. Patient was given ample time to ask questions. All questions were answered. Voices understanding and acceptance of this advice. Will call back if any further questions or concerns.                Anne-Marie Rondon,  MD [facility]

## 2023-08-04 NOTE — LETTER
August 8, 2023      [facility] [place] [facility]  [address], [address]  [place] [address]       Patient: Chandra Stone   YOB: 1970  Date of Visit: 08/04/2023    To Whom It May Concern:    Steffen Stone  was at [facility] on 08/04/2023. The patient may return to work/school on 08/14/2023 with no restrictions. If you have any questions or concerns, or if I can be of further assistance, please do not hesitate to contact me.    Sincerely,    Dr Anne-Marie Rondon MD.

## 2023-08-04 NOTE — LETTER
August 18, 2023      [facility] [place] [facility]  [address], [address]  [place] [address]       Patient: Chandra Stone   YOB: 1970      To Whom It May Concern:    Steffen Stone  was at [facility]. The patient was in the care from 07/31/2023 to 08/21/2023 Patient may return back 08/21/2023 with no restrictions. If you have any questions or concerns, or if I can be of further assistance, please do not hesitate to contact me.    Sincerely,    Anne-Marie Rondon MD.

## 2023-08-07 DIAGNOSIS — M75.01 ADHESIVE CAPSULITIS OF RIGHT SHOULDER: Primary | ICD-10-CM

## 2023-08-23 ENCOUNTER — PATIENT MESSAGE (OUTPATIENT)
Dept: ADMINISTRATIVE | Facility: HOSPITAL | Age: 53
End: 2023-08-23

## 2023-09-26 DIAGNOSIS — M25.511 CHRONIC RIGHT SHOULDER PAIN: Primary | ICD-10-CM

## 2023-09-26 DIAGNOSIS — Z12.11 COLON CANCER SCREENING: ICD-10-CM

## 2023-09-26 DIAGNOSIS — G89.29 CHRONIC RIGHT SHOULDER PAIN: Primary | ICD-10-CM

## 2023-09-28 ENCOUNTER — HOSPITAL ENCOUNTER (OUTPATIENT)
Dept: PREADMISSION TESTING | Facility: HOSPITAL | Age: 53
Discharge: HOME | End: 2023-09-28
Attending: FAMILY MEDICINE
Payer: COMMERCIAL

## 2023-09-28 DIAGNOSIS — Z12.11 COLON CANCER SCREENING: ICD-10-CM

## 2023-10-02 ENCOUNTER — HOSPITAL ENCOUNTER (OUTPATIENT)
Dept: PREADMISSION TESTING | Facility: HOSPITAL | Age: 53
Discharge: HOME | End: 2023-10-02
Attending: INTERNAL MEDICINE
Payer: COMMERCIAL

## 2023-10-17 ENCOUNTER — CLINICAL SUPPORT (OUTPATIENT)
Dept: PRIMARY CARE CLINIC | Facility: CLINIC | Age: 53
End: 2023-10-17
Payer: COMMERCIAL

## 2023-10-17 DIAGNOSIS — Z23 NEED FOR VACCINATION: Primary | ICD-10-CM

## 2023-10-17 PROCEDURE — 90471 IMMUNIZATION ADMIN: CPT | Mod: S$GLB,,, | Performed by: FAMILY MEDICINE

## 2023-10-17 PROCEDURE — 99999 PR PBB SHADOW E&M-EST. PATIENT-LVL I: ICD-10-PCS | Mod: PBBFAC,,,

## 2023-10-17 PROCEDURE — 99211 OFF/OP EST MAY X REQ PHY/QHP: CPT | Mod: PBBFAC,PN

## 2023-10-17 PROCEDURE — 90686 FLU VACCINE (QUAD) GREATER THAN OR EQUAL TO 3YO PRESERVATIVE FREE IM: ICD-10-PCS | Mod: S$GLB,,, | Performed by: FAMILY MEDICINE

## 2023-10-17 PROCEDURE — 99999 PR PBB SHADOW E&M-EST. PATIENT-LVL I: CPT | Mod: PBBFAC,,,

## 2023-10-17 PROCEDURE — 90686 IIV4 VACC NO PRSV 0.5 ML IM: CPT | Mod: S$GLB,,, | Performed by: FAMILY MEDICINE

## 2023-10-17 PROCEDURE — 90471 FLU VACCINE (QUAD) GREATER THAN OR EQUAL TO 3YO PRESERVATIVE FREE IM: ICD-10-PCS | Mod: S$GLB,,, | Performed by: FAMILY MEDICINE

## 2023-10-17 NOTE — PROGRESS NOTES
Verified patient by using 2 patient identifiers.  Administered  0.5mL to RIGHT deltoid without any problems or difficulty.  Patient instructed to wait 15 minutes post injection to monitor for adverse reactions, verbalized understanding.  Educated patient on signs of adverse reactions, and instructed patient to report any signs of adverse reactions to the  staff so that the clinical team is made aware, verbalized understanding.

## 2023-10-26 ENCOUNTER — OFFICE VISIT (OUTPATIENT)
Dept: SPORTS MEDICINE | Facility: CLINIC | Age: 53
End: 2023-10-26
Payer: COMMERCIAL

## 2023-10-26 DIAGNOSIS — M25.511 CHRONIC RIGHT SHOULDER PAIN: ICD-10-CM

## 2023-10-26 DIAGNOSIS — G89.29 CHRONIC RIGHT SHOULDER PAIN: ICD-10-CM

## 2023-10-26 DIAGNOSIS — M75.31 CALCIFIC TENDINITIS OF RIGHT SHOULDER: Primary | ICD-10-CM

## 2023-10-26 PROCEDURE — 76882 US LMTD JT/FCL EVL NVASC XTR: CPT | Mod: S$GLB,,, | Performed by: STUDENT IN AN ORGANIZED HEALTH CARE EDUCATION/TRAINING PROGRAM

## 2023-10-26 PROCEDURE — 1159F PR MEDICATION LIST DOCUMENTED IN MEDICAL RECORD: ICD-10-PCS | Mod: CPTII,S$GLB,, | Performed by: STUDENT IN AN ORGANIZED HEALTH CARE EDUCATION/TRAINING PROGRAM

## 2023-10-26 PROCEDURE — 76882 PR  US,EXTREMITY,NONVASCULAR,REAL-TIME IMAGE,LIMITED: ICD-10-PCS | Mod: S$GLB,,, | Performed by: STUDENT IN AN ORGANIZED HEALTH CARE EDUCATION/TRAINING PROGRAM

## 2023-10-26 PROCEDURE — 3044F PR MOST RECENT HEMOGLOBIN A1C LEVEL <7.0%: ICD-10-PCS | Mod: CPTII,S$GLB,, | Performed by: STUDENT IN AN ORGANIZED HEALTH CARE EDUCATION/TRAINING PROGRAM

## 2023-10-26 PROCEDURE — 1159F MED LIST DOCD IN RCRD: CPT | Mod: CPTII,S$GLB,, | Performed by: STUDENT IN AN ORGANIZED HEALTH CARE EDUCATION/TRAINING PROGRAM

## 2023-10-26 PROCEDURE — 1160F RVW MEDS BY RX/DR IN RCRD: CPT | Mod: CPTII,S$GLB,, | Performed by: STUDENT IN AN ORGANIZED HEALTH CARE EDUCATION/TRAINING PROGRAM

## 2023-10-26 PROCEDURE — 99204 OFFICE O/P NEW MOD 45 MIN: CPT | Mod: S$GLB,,, | Performed by: STUDENT IN AN ORGANIZED HEALTH CARE EDUCATION/TRAINING PROGRAM

## 2023-10-26 PROCEDURE — 99999 PR PBB SHADOW E&M-EST. PATIENT-LVL III: CPT | Mod: PBBFAC,,, | Performed by: STUDENT IN AN ORGANIZED HEALTH CARE EDUCATION/TRAINING PROGRAM

## 2023-10-26 PROCEDURE — 99999 PR PBB SHADOW E&M-EST. PATIENT-LVL III: ICD-10-PCS | Mod: PBBFAC,,, | Performed by: STUDENT IN AN ORGANIZED HEALTH CARE EDUCATION/TRAINING PROGRAM

## 2023-10-26 PROCEDURE — 99204 PR OFFICE/OUTPT VISIT, NEW, LEVL IV, 45-59 MIN: ICD-10-PCS | Mod: S$GLB,,, | Performed by: STUDENT IN AN ORGANIZED HEALTH CARE EDUCATION/TRAINING PROGRAM

## 2023-10-26 PROCEDURE — 3044F HG A1C LEVEL LT 7.0%: CPT | Mod: CPTII,S$GLB,, | Performed by: STUDENT IN AN ORGANIZED HEALTH CARE EDUCATION/TRAINING PROGRAM

## 2023-10-26 PROCEDURE — 1160F PR REVIEW ALL MEDS BY PRESCRIBER/CLIN PHARMACIST DOCUMENTED: ICD-10-PCS | Mod: CPTII,S$GLB,, | Performed by: STUDENT IN AN ORGANIZED HEALTH CARE EDUCATION/TRAINING PROGRAM

## 2023-10-26 NOTE — PROGRESS NOTES
Patient ID: Chandra Stone  YOB: 1970  MRN: [id number]    Chief Complaint: Pain of the Right Shoulder    Referred By: Dr. Ronodn    Occupation: phlebotimist specimen processor      History of Present Illness: Chandra Stone is a right-hand dominant 53 y.o. male who presents today with Pain of the Right Shoulder    He complains of right shoulder pain that began in 2022 without injury.  He developed severe pain and stiffness and was treated by his PCP Dr. Rondon in August 2022 with a glenohumeral joint injection which provided excellent relief.  His symptoms improved until the following August 2023 when he developed sudden, acute onset of right shoulder pain.  Dr. Rondon performed a subacromial cortisone injection but this provided little relief.  He missed an entire week of work at the time.  He has had a second severe flare up more recently.  He was told that he has calcific tendinitis.  He has attended physical therapy, used ibuprofen and tylenol, and apparently had some kind of trigger point injection as well.    Past Medical History:   Past Medical History:   Diagnosis Date    HIV (human immunodeficiency virus infection)      Past Surgical History:   Procedure Laterality Date    CIRCUMCISION       Family History   Problem Relation Age of Onset    Hypertension Mother     Diabetes Mother     Diabetes Brother     Hypertension Brother      Social History     Socioeconomic History    Marital status: Single   Tobacco Use    Smoking status: Former     Types: Cigarettes    Smokeless tobacco: Former    Tobacco comments:     quit over 5 years ago    Substance and Sexual Activity    Alcohol use: Yes     Comment: 3/week     Drug use: Never     Medication List with Changes/Refills   Current Medications    ASPIRIN (ECOTRIN) 81 MG EC TABLET    Take 1 tablet (81 mg total) by mouth once daily.    SASEYPWUX-TBYCPFPS-AGKTQCL ALA (BIKTARVY) -25 MG PER TABLET    Take 1 tablet by mouth once daily.     COLCHICINE (COLCRYS) 0.6 MG TABLET    Take 1 tablet (0.6 mg total) by mouth 2 (two) times daily. for 10 days    DICLOFENAC (VOLTAREN) 75 MG EC TABLET    Take 1 tablet (75 mg total) by mouth 2 (two) times daily as needed (back pain).    TAMSULOSIN (FLOMAX) 0.4 MG CAP    Take 1 capsule (0.4 mg total) by mouth once daily. for 7 days     Review of patient's allergies indicates:  No Known Allergies    Physical Exam:   There is no height or weight on file to calculate BMI.    Physical Exam  Detailed MSK exam:     Right Shoulder:  Inspection: No swelling, erythema or ecchymosis.   Palpation tenderness: Nontender to palpation  Range of motion: 175 deg Flexion         70 deg External Rotation in Adduction         IR to Midthoracic  Strength:  5/5 Abduction    5/5 External Rotation in Adduction    5/5 Internal Rotation   Special Tests: Negative Cabrera-Gilberto    Negative Neer's    Negative Speed's    Negative Pain with AB/ER  N/V Exam:  Radial: Normal motor (EPL/thumbs up)              Normal sensory (dorsal hand)   Median: Normal motor (FPL/A-OK)      Normal sensory (thumb)   Ulnar:  Normal motor (Interossei/scissors-spread)     Normal sensory (5th finger)   LABC: Normal sensory (lateral forearm)   MABC: Normal sensory (medial forearm)   MC: Normal motor (elbow flexion)   Axillary: Normal motor/sensory (deltoid)  Normal radial and ulnar pulses, warm and well perfused with capillary refill < 2 sec    Imaging:  XR Results:  Results for orders placed during the hospital encounter of 08/22/22    X-ray Shoulder 2 or More Views Right    Narrative  EXAMINATION:  XR SHOULDER COMPLETE 2 OR MORE VIEWS RIGHT    CLINICAL HISTORY:  Adhesive capsulitis of right shoulder    TECHNIQUE:  Two or three views of the right shoulder were performed.    COMPARISON:  None    FINDINGS:  No fracture or dislocation.  The alignment and joint spaces are normal.  Involving the region of the greater tuberosity a calcific density is identified suggestive  of calcific tendinosis of the supraspinatus.  The remaining shoulder girdle is intact.  No soft tissue swelling.    The visualized lungs are clear the visualized ribs are intact.    Impression  No fracture or dislocation right shoulder.  Findings suggestive of calcific tendinosis involving the likely supraspinatus tendinous insertion.      Electronically signed by: Roverto Barrientos MD  Date:    08/22/2022  Time:    11:40      Relevant imaging results were reviewed and interpreted by me and per my read:  Calcific density at the footprint of the supraspinatus tendon near its insertion on the greater tubercle.  Normal alignment overall.  No significant degenerative changes of the joints.  No fractures or other acute abnormalities.      FOCUSED:  1. Diagnostic Extremity - MSK-Sports Ultrasound was recommended due to right shoulder pain.  2. Diagnostic Extremity - MSK-Sports Ultrasound Performed: Martin Regan Extremity Study: right supraspinatus tendon.    TECHNIQUE: Real time ultrasound examination of the right supraspinatus tendon was performed with SonoSite Edge 2, 9-L MHz linear probe(s).     FINDINGS: The images are of adequate diagnostic quality with identification of all echogenic structures made except for the vascular structures unless otherwise noted.     Large heterogeneity foci at the distal supraspinatus tendon near its insertion on the greater tubercle.  No evidence significant tearing.  Minimal fluid in the subacromial bursa.  Corresponding with maximal tenderness with sonopalpation.    The rest of the sonographic examination was unremarkable.  Ultrasound images were directly reviewed with the patient and then a treatment plan was discussed.  The images were saved and stored for documentation in the EMR.     IMPRESSION:  Calcific tendinopathy/tendinosis of the distal supraspinatus tendon near its insertion on the greater tubercle, without any obvious tearing.      This was discussed with the patient and /  or family today.     Patient Instructions   Assessment:  Chandra Stone is a 53 y.o. male with a chief complaint of Pain of the Right Shoulder    Encounter Diagnoses   Name Primary?    Calcific tendinitis of right shoulder Yes    Chronic right shoulder pain       Plan:  XR reviewed - large calcific tendon deposit in rotator cuff  The patient's history, clinical exam, and imaging findings are consistent with chronic right shoulder pain due to calcific tendinitis.   We have reviewed the natural history of this disorder and discussed the diagnosis, treatment options, and prognosis in detail.   Patient has had corticosteroid injections in the past, taken NSAIDs, and had round of physical therapy, but still with severe exacerbations of right shoulder pain  MSK US exam performed today  Calcific density within the footprint of the supraspinatus tendon.  No abnormalities of the subscapularis, infraspinatus, or teres minor.  Discussed further treatment options, including waiting for additional flare, then repeating subacromial bursa injection at that time, repeating physical therapy, more advanced treatment with minimally invasive tenotomy/TenJet procedure, or consideration for surgical evaluation and debridement of the calcific density.  The patient would like to consider TenJet of the right rotator cuff.  Information below.  Okay to continue oral diclofenac in the meantime, twice daily as needed for pain.  If we are going to proceed with TenJet, then you will need to stop all NSAID use for at least 1 week prior to the procedure.    Follow-up: will call us with his decision or sooner if there are any problems between now and then.         Thank you for choosing [facility] and Dr. Martin Regan for your orthopedic & sports medicine care. It is our goal to provide you with exceptional care that will help keep you healthy, active, and get you back in the game.    Please do not hesitate to reach out  to us via email, phone, or MyChart with any questions, concerns, or feedback.    If you are experiencing pain/discomfort ,or have questions after 5pm and would like to be connected to the [facility] on-call team, please call this number and specify which Sports Medicine provider is treating you: [phone]            A copy of today's visit note has been sent to the referring provider.           Martin Regan MD  Primary Care Sports Medicine    Disclaimer: This note was prepared using a voice recognition system and is likely to have sound alike errors within the text.

## 2023-10-26 NOTE — PATIENT INSTRUCTIONS
Assessment:  Chandra Stone is a 53 y.o. male with a chief complaint of Pain of the Right Shoulder    Encounter Diagnoses   Name Primary?    Calcific tendinitis of right shoulder Yes    Chronic right shoulder pain       Plan:  XR reviewed - large calcific tendon deposit in rotator cuff  The patient's history, clinical exam, and imaging findings are consistent with chronic right shoulder pain due to calcific tendinitis.   We have reviewed the natural history of this disorder and discussed the diagnosis, treatment options, and prognosis in detail.   Patient has had corticosteroid injections in the past, taken NSAIDs, and had round of physical therapy, but still with severe exacerbations of right shoulder pain  MSK US exam performed today  Calcific density within the footprint of the supraspinatus tendon.  No abnormalities of the subscapularis, infraspinatus, or teres minor.  Discussed further treatment options, including waiting for additional flare, then repeating subacromial bursa injection at that time, repeating physical therapy, more advanced treatment with minimally invasive tenotomy/TenJet procedure, or consideration for surgical evaluation and debridement of the calcific density.  The patient would like to consider TenJet of the right rotator cuff.  Information below.  Okay to continue oral diclofenac in the meantime, twice daily as needed for pain.  If we are going to proceed with TenJet, then you will need to stop all NSAID use for at least 1 week prior to the procedure.    Follow-up: will call us with his decision or sooner if there are any problems between now and then.         Thank you for choosing [facility] and Dr. Martin Regan for your orthopedic & sports medicine care. It is our goal to provide you with exceptional care that will help keep you healthy, active, and get you back in the game.    Please do not hesitate to reach out to us via email, phone, or MyChart with any  questions, concerns, or feedback.    If you are experiencing pain/discomfort ,or have questions after 5pm and would like to be connected to the [facility] on-call team, please call this number and specify which Sports Medicine provider is treating you: [phone]        Here is some general information on TenJet procedure. You may also watch the video on this link to see an overview of how the procedure goes as well.   TenJet Video

## 2023-10-31 ENCOUNTER — TELEPHONE (OUTPATIENT)
Dept: SPORTS MEDICINE | Facility: CLINIC | Age: 53
End: 2023-10-31
Payer: COMMERCIAL

## 2023-10-31 NOTE — TELEPHONE ENCOUNTER
----- Message from Dylon Wisdom sent at 10/31/2023  9:27 AM CDT -----  Contact: Patient  Chandra HOOD Bertha would like a call back at [phone], in regards to scheduling his procedure.

## 2023-10-31 NOTE — TELEPHONE ENCOUNTER
Called pt. Assisted with scheduling tenjet procedure.   Pt requested call back if there are any issues with authorization prior to his procedure and a call back with any pre-procedure instructions.

## 2023-11-09 ENCOUNTER — TELEPHONE (OUTPATIENT)
Dept: PREADMISSION TESTING | Facility: HOSPITAL | Age: 53
End: 2023-11-09
Payer: COMMERCIAL

## 2023-11-09 ENCOUNTER — PROCEDURE VISIT (OUTPATIENT)
Dept: SPORTS MEDICINE | Facility: CLINIC | Age: 53
End: 2023-11-09
Payer: COMMERCIAL

## 2023-11-09 DIAGNOSIS — M75.31 CALCIFIC TENDINITIS OF RIGHT SHOULDER: Primary | ICD-10-CM

## 2023-11-09 DIAGNOSIS — M25.511 CHRONIC RIGHT SHOULDER PAIN: ICD-10-CM

## 2023-11-09 DIAGNOSIS — G89.29 CHRONIC RIGHT SHOULDER PAIN: ICD-10-CM

## 2023-11-09 PROCEDURE — 23405 INCISION OF TENDON & MUSCLE: CPT | Mod: S$GLB,,, | Performed by: STUDENT IN AN ORGANIZED HEALTH CARE EDUCATION/TRAINING PROGRAM

## 2023-11-09 PROCEDURE — 23405 PR INCISE TENDON/MUSCLE,SHLDR,SINGLE: ICD-10-PCS | Mod: S$GLB,,, | Performed by: STUDENT IN AN ORGANIZED HEALTH CARE EDUCATION/TRAINING PROGRAM

## 2023-11-09 PROCEDURE — 99499 UNLISTED E&M SERVICE: CPT | Mod: S$GLB,,, | Performed by: STUDENT IN AN ORGANIZED HEALTH CARE EDUCATION/TRAINING PROGRAM

## 2023-11-09 PROCEDURE — 99499 NO LOS: ICD-10-PCS | Mod: S$GLB,,, | Performed by: STUDENT IN AN ORGANIZED HEALTH CARE EDUCATION/TRAINING PROGRAM

## 2023-11-09 RX ORDER — TRAMADOL HYDROCHLORIDE 50 MG/1
TABLET ORAL
Qty: 28 TABLET | Refills: 0 | Status: SHIPPED | OUTPATIENT
Start: 2023-11-09

## 2023-11-09 NOTE — PATIENT INSTRUCTIONS
Assessment:  Chandra Stone is a 53 y.o. male with a chief complaint of No chief complaint on file.    Encounter Diagnoses   Name Primary?    Calcific tendinitis of right shoulder Yes    Chronic right shoulder pain       Plan:  Shoulder TenJet procedure today.  Postprocedural instructions below  Placed in sling post-prcedure  At least 10 minutes were spent sizing, fitting, and educating regarding durable medical equipment today by clinical assistant under direction of Martin Regan MD.  CPT 11601.  Home exercise program given today.  At least 10 minutes were spent developing, teaching, and performing a home exercise program under the direction of Martin Regan MD.  A written summary was provided and all questions were answered.    Follow-up:  6 weeks or sooner if there are any problems between now and then.    Thank you for choosing [facility] and Dr. Martin Regan for your orthopedic & sports medicine care. It is our goal to provide you with exceptional care that will help keep you healthy, active, and get you back in the game.    Please do not hesitate to reach out to us via email, phone, or MyChart with any questions, concerns, or feedback.    If you are experiencing pain/discomfort ,or have questions after 5pm and would like to be connected to the [facility] on-call team, please call this number and specify which Sports Medicine provider is treating you: [phone]         TENJET POST-PROCEDURE INSTRUCTIONS    1. WOUND CARE    - Keep bandages and procedure area clean and dry for 5 days    - Avoid submerging area in water for 5 days    - You did not have any sutures or stitches placed. Steri-strips were placed over the wound. These will fall off in the shower after about one week. If they have not fallen off after the first 9 days, you can remove them yourself.    - CONTACT OUR OFFICE IF:    - The area become red or hot to touch    - You  have a fever of 100° or more (but do not wait for a response, seek immediate care)    - You have increased pain or swelling    - You have drainage from the treatment site    Best way to connect would be via [employer] or call [facility] at [phone]. If unable to connect, please proceed to the nearest Emergency Care Center.    2. POST-PROCEDURE PAIN CONTROL    - You may apply ice for 20 minutes as needed for discomfort    - Pain control: Tramadol as needed.    3. WEEK BY WEEK SCHEDULE    Weeks 1-2    Immobilization: Sling for comfort    Lifting restriction: 5 lbs    Activity/work limitations: No overuse/repetitive activity    Week 3-5    Therapy: Start at the beginning of week 3    Immobilization: none    Return to work/activity: Per guidance of Physical therapist    Week 6-8    Follow-up in the office with Dr. Regan    Week 7+    Progress with your training or return to work. Typically, full results are noted at 3 months and beyond.    Thank you for choosing the [facility] for your care.

## 2023-11-09 NOTE — PROCEDURES
Procedures  TENJET Operative Note:    PATIENT NAME: Chandra Stone    MEDICAL RECORD NUMBER: [id number]    AGE: 53 y.o.    INFORMED CONSENT: I verify that I personally obtained Chandra Stone's consent which was signed and uploaded into [employer].     TIMEOUT: Audible timeout was performed at 1325.   At this time, the team confirmed the correct patient, correct procedure and correct site with laterality. The patient's allergies were reviewed. The procedure site was marked. The procedure team checked for proper functioning of devices and supplies to be used for the procedure. The procedure team reviewed the relevant diagnostic tests pertinent to the procedure.    PHYSICIAN: Martin Regan    LOCATION: [facility], [place].      PROCEDURE: TenJet Percutaneous Tenotomy procedure for calcific tendinopathy of the right shoulder    ANESTHESIA: local    PREOPERATIVE DIAGNOSIS:  Calcific tendinitis right shoulder, chronic right shoulder pain    POSTOPERATIVE DIAGNOSIS:  Same    PROCEDURE DESCRIPTION:    The treatment area was cleaned and draped in sterile fashion. Using sterile technique, a Jixee-Turbo ultrasound laptop unit with a variable frequency (13.0-6.0 MHz) linear transducer was used to successfully localize the area of pathology to be treated today. A arvin with a sterile marker was placed, on the skin, at the area of maximum tenderness. Then the treatment area was cleaned and draped in sterile fashion. A 22 gauge needle was visualized under ultrasound administering anesthetic lidocaine, to locally anesthetize the treatment area. A separate 25 gauge needle was then utilized to create a skin wheel using 1% lidocaine with epinephrine 1.5cm from the treatment area. An 11 gauge scalpel was used to make a small puncture incision in the area of the skin wheel, and ultrasound was utilized to visualize the scalpel at the target area. The 3 inch tenotomy needle was inserted into  the pathologic tissue under direct ultrasound guidance, and tenotomy was performed with degenerative tendinotic tissue removed. Upon completion, gentle compression was applied to the site with sterile gauze. Adhesive strips, occlusive dressing, and compression bandage were then applied.    Patient left the procedure room in satisfactory condition having tolerated the procedure well.    CUTTING TIME:  1345    TOTAL VOLUME USED: 2,000 mL    ESTIMATED BLOOD LOSS: <5 ml    COMPLICATIONS: none    POSTOPERATIVE PLAN:   As indicated in the AVS given to the patient today post procedure.      Patient voiced understanding of these instructions.          Martin Regan MD [facility]  Primary Care Sports Medicine  11/09/2023  2:20 PM

## 2023-11-14 ENCOUNTER — PATIENT MESSAGE (OUTPATIENT)
Dept: SPORTS MEDICINE | Facility: CLINIC | Age: 53
End: 2023-11-14
Payer: COMMERCIAL

## 2024-01-17 ENCOUNTER — CLINICAL SUPPORT (OUTPATIENT)
Dept: PRIMARY CARE CLINIC | Facility: CLINIC | Age: 54
End: 2024-01-17
Payer: COMMERCIAL

## 2024-01-17 DIAGNOSIS — B96.89 BACTERIAL LOWER RESPIRATORY INFECTION: Primary | ICD-10-CM

## 2024-01-17 DIAGNOSIS — J22 BACTERIAL LOWER RESPIRATORY INFECTION: Primary | ICD-10-CM

## 2024-01-17 DIAGNOSIS — R68.89 FLU-LIKE SYMPTOMS: Primary | ICD-10-CM

## 2024-01-17 LAB
CTP QC/QA: YES
CTP QC/QA: YES
POC MOLECULAR INFLUENZA A AGN: NEGATIVE
POC MOLECULAR INFLUENZA B AGN: NEGATIVE
SARS-COV-2 RDRP RESP QL NAA+PROBE: NEGATIVE

## 2024-01-17 PROCEDURE — 87502 INFLUENZA DNA AMP PROBE: CPT | Mod: QW,S$GLB,, | Performed by: NURSE PRACTITIONER

## 2024-01-17 PROCEDURE — 87635 SARS-COV-2 COVID-19 AMP PRB: CPT | Mod: QW,S$GLB,, | Performed by: NURSE PRACTITIONER

## 2024-01-17 RX ORDER — DOXYCYCLINE 100 MG/1
100 CAPSULE ORAL EVERY 12 HOURS
Qty: 14 CAPSULE | Refills: 0 | Status: SHIPPED | OUTPATIENT
Start: 2024-01-17 | End: 2024-01-24

## 2024-01-17 NOTE — PROGRESS NOTES
Patient presents with cough and  COVID and Flu symptoms, patient tested, ALL negative. Provider informed.

## 2024-02-09 DIAGNOSIS — E87.6 HYPOKALEMIA: Primary | ICD-10-CM

## 2024-02-09 RX ORDER — POTASSIUM CHLORIDE 750 MG/1
10 CAPSULE, EXTENDED RELEASE ORAL 2 TIMES DAILY
Qty: 60 CAPSULE | Refills: 0 | Status: SHIPPED | OUTPATIENT
Start: 2024-02-09 | End: 2024-03-10

## 2024-03-27 DIAGNOSIS — R05.9 COUGH, UNSPECIFIED TYPE: Primary | ICD-10-CM

## 2024-03-27 RX ORDER — PROMETHAZINE HYDROCHLORIDE AND DEXTROMETHORPHAN HYDROBROMIDE 6.25; 15 MG/5ML; MG/5ML
5 SYRUP ORAL EVERY 6 HOURS PRN
Qty: 120 ML | Refills: 0 | Status: SHIPPED | OUTPATIENT
Start: 2024-03-27 | End: 2024-04-06

## 2024-06-27 DIAGNOSIS — H10.32 ACUTE BACTERIAL CONJUNCTIVITIS OF LEFT EYE: Primary | ICD-10-CM

## 2024-06-27 RX ORDER — NEOMYCIN SULFATE, POLYMYXIN B SULFATE AND DEXAMETHASONE 3.5; 10000; 1 MG/ML; [USP'U]/ML; MG/ML
1 SUSPENSION/ DROPS OPHTHALMIC EVERY 4 HOURS
Qty: 5 ML | Refills: 1 | Status: SHIPPED | OUTPATIENT
Start: 2024-06-27 | End: 2024-07-04

## 2024-06-27 RX ORDER — MOXIFLOXACIN 5 MG/ML
1 SOLUTION OPHTHALMIC 2 TIMES DAILY
Qty: 3 ML | Refills: 1 | Status: SHIPPED | OUTPATIENT
Start: 2024-06-27 | End: 2024-06-27

## 2024-10-11 ENCOUNTER — OFFICE VISIT (OUTPATIENT)
Dept: PRIMARY CARE CLINIC | Facility: CLINIC | Age: 54
End: 2024-10-11
Payer: COMMERCIAL

## 2024-10-11 DIAGNOSIS — F41.9 ACUTE ANXIETY: Primary | ICD-10-CM

## 2024-10-11 DIAGNOSIS — F41.9 INSOMNIA SECONDARY TO ANXIETY: ICD-10-CM

## 2024-10-11 DIAGNOSIS — M75.01 ADHESIVE CAPSULITIS OF RIGHT SHOULDER: Chronic | ICD-10-CM

## 2024-10-11 DIAGNOSIS — F51.05 INSOMNIA SECONDARY TO ANXIETY: ICD-10-CM

## 2024-10-11 PROCEDURE — 99999 PR PBB SHADOW E&M-EST. PATIENT-LVL II: CPT | Mod: PBBFAC,,, | Performed by: FAMILY MEDICINE

## 2024-10-11 RX ORDER — IBUPROFEN 800 MG/1
800 TABLET ORAL EVERY 8 HOURS PRN
Qty: 60 TABLET | Refills: 0 | Status: SHIPPED | OUTPATIENT
Start: 2024-10-11

## 2024-10-11 RX ORDER — CLONAZEPAM 1 MG/1
1 TABLET ORAL 2 TIMES DAILY PRN
Qty: 30 TABLET | Refills: 0 | Status: SHIPPED | OUTPATIENT
Start: 2024-10-11 | End: 2024-10-26

## 2024-10-11 NOTE — PROGRESS NOTES
Subjective:      Chief Complaint   Patient presents with    Anxiety    Insomnia    Shoulder Pain      Patient ID: Chandra Stone is a 54 y.o. male.  History of Present Illness            Chandra Stone's allergies, medications, history, and problem list were updated as appropriate.  Past Medical History:   Diagnosis Date    HIV (human immunodeficiency virus infection)      Family History   Problem Relation Name Age of Onset    Hypertension Mother      Diabetes Mother      Diabetes Brother      Hypertension Brother       Social History     Socioeconomic History    Marital status: Single   Tobacco Use    Smoking status: Former     Types: Cigarettes    Smokeless tobacco: Former    Tobacco comments:     quit over 5 years ago    Substance and Sexual Activity    Alcohol use: Yes     Comment: 3/week     Drug use: Never     Outpatient Encounter Medications as of 10/11/2024   Medication Sig Dispense Refill    aspirin (ECOTRIN) 81 MG EC tablet Take 1 tablet (81 mg total) by mouth once daily. 30 tablet 11    vnihqfilg-rzmvoxes-podbbbj ala (BIKTARVY) -25 mg per tablet Take 1 tablet by mouth once daily.      clonazePAM (KLONOPIN) 1 MG tablet Take 1 tablet (1 mg total) by mouth 2 (two) times daily as needed for Anxiety. 30 tablet 0    colchicine (COLCRYS) 0.6 mg tablet Take 1 tablet (0.6 mg total) by mouth 2 (two) times daily. for 10 days 20 tablet 0    ibuprofen (ADVIL,MOTRIN) 800 MG tablet Take 1 tablet (800 mg total) by mouth every 8 (eight) hours as needed for Pain. 60 tablet 0    tamsulosin (FLOMAX) 0.4 mg Cap Take 1 capsule (0.4 mg total) by mouth once daily. for 7 days 7 capsule 0    traMADoL (ULTRAM) 50 mg tablet Take 1 tablet (50 mg), by mouth, every 6-8 hours, as needed for pain 28 tablet 0    [DISCONTINUED] diclofenac (VOLTAREN) 75 MG EC tablet Take 1 tablet (75 mg total) by mouth 2 (two) times daily as needed (back pain). 30 tablet 2     No facility-administered encounter medications on file as of  10/11/2024.          Objective:      There were no vitals taken for this visit.  Physical Exam  Musculoskeletal:      Right shoulder: Tenderness present.        Arms:         Physical Exam        CONSTITUTIONAL: No apparent distress. Appears comfortable. Does not appear acutely ill or septic. Appears adequately hydrated.  CARDIOVASCULAR: No perioral cyanosis  PULMONARY: Breathing unlabored. No retractions Chest expansion grossly normal.  PSYCHIATRIC: Alert and conversant and grossly oriented. Mood is grossly neutral. Affect appropriate. Judgment and insight grossly intact.  NEUROLOGIC: No focal sensory deficits reported.        Results for orders placed or performed in visit on 01/17/24   POCT COVID-19 Rapid Screening    Collection Time: 01/17/24 10:11 AM   Result Value Ref Range    POC Rapid COVID Negative Negative     Acceptable Yes    POCT Influenza A/B Molecular    Collection Time: 01/17/24 10:11 AM   Result Value Ref Range    POC Molecular Influenza A Ag Negative Negative, Not Reported    POC Molecular Influenza B Ag Negative Negative, Not Reported     Acceptable Yes      Assessment/Plan:         1. Acute anxiety    2. Adhesive capsulitis of right shoulder    3. Insomnia secondary to anxiety      Acute anxiety  Comments:  -acute onset of anxiety, worsening. Psychosocial trigger-friend was murdered. Asso with loss of appetite, insomnia, inability to control thoughts. Denies SI/HI.  Orders:  -     clonazePAM (KLONOPIN) 1 MG tablet; Take 1 tablet (1 mg total) by mouth 2 (two) times daily as needed for Anxiety.  Dispense: 30 tablet; Refill: 0    Adhesive capsulitis of right shoulder  Comments:  stable, takes ibuprofen prn as needed, needs refill  Orders:  -     ibuprofen (ADVIL,MOTRIN) 800 MG tablet; Take 1 tablet (800 mg total) by mouth every 8 (eight) hours as needed for Pain.  Dispense: 60 tablet; Refill: 0    Insomnia secondary to anxiety  Comments:  -expected to improve as acute  anxiety resolves.                   I have reviewed all of the patient's clinical history available in care everywhere and Epic and have utilized this in my evaluation and management recommendations today.      Treatment options and alternatives were discussed with the patient. Patient was given ample time to ask questions. All questions were answered. Voices understanding and acceptance of this advice. Will call back if any further questions or concerns.       Portions of the record may have been created with voice recognition software. Occasional wrong-word or sound-a-like substitutions may have occurred due to the inherent limitations of voice recognition software. Read the chart carefully and recognize, using context, where substitutions have occurred.      This note was generated with the assistance of ambient listening technology. Verbal consent was obtained by the patient and accompanying visitor(s) for the recording of patient appointment to facilitate this note. I attest to having reviewed and edited the generated note for accuracy, though some syntax or spelling errors may persist. Please contact the author of this note for any clarification.            Anne-Marie Rondon MD  [facility],

## 2024-10-17 ENCOUNTER — PATIENT MESSAGE (OUTPATIENT)
Dept: ADMINISTRATIVE | Facility: HOSPITAL | Age: 54
End: 2024-10-17
Payer: COMMERCIAL

## 2024-10-18 DIAGNOSIS — Z12.11 SCREENING FOR COLON CANCER: ICD-10-CM

## 2024-12-18 ENCOUNTER — OFFICE VISIT (OUTPATIENT)
Dept: PRIMARY CARE CLINIC | Facility: CLINIC | Age: 54
End: 2024-12-18
Payer: COMMERCIAL

## 2024-12-18 VITALS
BODY MASS INDEX: 30.31 KG/M2 | WEIGHT: 200 LBS | HEIGHT: 68 IN | TEMPERATURE: 99 F | SYSTOLIC BLOOD PRESSURE: 128 MMHG | DIASTOLIC BLOOD PRESSURE: 80 MMHG | HEART RATE: 87 BPM | OXYGEN SATURATION: 97 %

## 2024-12-18 DIAGNOSIS — R05.1 ACUTE COUGH: Primary | ICD-10-CM

## 2024-12-18 DIAGNOSIS — E66.811 CLASS 1 OBESITY WITHOUT SERIOUS COMORBIDITY WITH BODY MASS INDEX (BMI) OF 30.0 TO 30.9 IN ADULT, UNSPECIFIED OBESITY TYPE: ICD-10-CM

## 2024-12-18 DIAGNOSIS — A49.9 BACTERIAL INFECTION: ICD-10-CM

## 2024-12-18 PROCEDURE — 99999 PR PBB SHADOW E&M-EST. PATIENT-LVL IV: CPT | Mod: PBBFAC,,, | Performed by: FAMILY MEDICINE

## 2024-12-18 RX ORDER — PROMETHAZINE HYDROCHLORIDE AND DEXTROMETHORPHAN HYDROBROMIDE 6.25; 15 MG/5ML; MG/5ML
5 SYRUP ORAL EVERY 8 HOURS PRN
Qty: 118 ML | Refills: 0 | Status: SHIPPED | OUTPATIENT
Start: 2024-12-18 | End: 2025-01-01

## 2024-12-18 RX ORDER — AZITHROMYCIN 250 MG/1
TABLET, FILM COATED ORAL
Qty: 6 TABLET | Refills: 0 | Status: SHIPPED | OUTPATIENT
Start: 2024-12-18 | End: 2024-12-23

## 2024-12-18 NOTE — PROGRESS NOTES
Subjective:      Chief Complaint   Patient presents with    Cough      Patient ID: Chandra Stone is a 54 y.o. male.  History of Present Illness        Cough x 1-7 days, worsening, dry and disturbs sleep. Denies fever.       Chandra Stone's allergies, medications, history, and problem list were updated as appropriate.  Past Medical History:   Diagnosis Date    HIV (human immunodeficiency virus infection)      Family History   Problem Relation Name Age of Onset    Hypertension Mother      Diabetes Mother      Diabetes Brother      Hypertension Brother       Social History     Socioeconomic History    Marital status: Single   Tobacco Use    Smoking status: Former     Types: Cigarettes    Smokeless tobacco: Former    Tobacco comments:     quit over 5 years ago    Substance and Sexual Activity    Alcohol use: Yes     Comment: 3/week     Drug use: Never     Outpatient Encounter Medications as of 12/18/2024   Medication Sig Dispense Refill    aspirin (ECOTRIN) 81 MG EC tablet Take 1 tablet (81 mg total) by mouth once daily. 30 tablet 11    azithromycin (Z-ALICIA) 250 MG tablet Take 2 tablets by mouth on day 1; Take 1 tablet by mouth on days 2-5 6 tablet 0    vwizxiyjm-vhfehvac-vacdtle ala (BIKTARVY) -25 mg per tablet Take 1 tablet by mouth once daily.      clonazePAM (KLONOPIN) 1 MG tablet Take 1 tablet (1 mg total) by mouth 2 (two) times daily as needed for Anxiety. 30 tablet 0    colchicine (COLCRYS) 0.6 mg tablet Take 1 tablet (0.6 mg total) by mouth 2 (two) times daily. for 10 days 20 tablet 0    ibuprofen (ADVIL,MOTRIN) 800 MG tablet Take 1 tablet (800 mg total) by mouth every 8 (eight) hours as needed for Pain. 60 tablet 0    promethazine-dextromethorphan (PROMETHAZINE-DM) 6.25-15 mg/5 mL Syrp Take 5 mLs by mouth every 8 (eight) hours as needed (cough and congestion). 118 mL 0    tamsulosin (FLOMAX) 0.4 mg Cap Take 1 capsule (0.4 mg total) by mouth once daily. for 7 days 7 capsule 0     "traMADoL (ULTRAM) 50 mg tablet Take 1 tablet (50 mg), by mouth, every 6-8 hours, as needed for pain 28 tablet 0     No facility-administered encounter medications on file as of 12/18/2024.          Objective:      /80   Pulse 87   Temp 98.7 °F (37.1 °C) (Oral)   Ht 5' 8" (1.727 m)   Wt 90.7 kg (200 lb)   SpO2 97%   BMI 30.41 kg/m²   Physical Exam  Vitals and nursing note reviewed.   Constitutional:       General: He is not in acute distress.  HENT:      Head: Normocephalic and atraumatic.   Cardiovascular:      Rate and Rhythm: Normal rate.   Pulmonary:      Effort: Pulmonary effort is normal. No respiratory distress.   Neurological:      General: No focal deficit present.      Mental Status: He is alert.      Cranial Nerves: No cranial nerve deficit.   Psychiatric:         Behavior: Behavior normal.         Thought Content: Thought content normal.                   Results for orders placed or performed in visit on 01/17/24   POCT COVID-19 Rapid Screening    Collection Time: 01/17/24 10:11 AM   Result Value Ref Range    POC Rapid COVID Negative Negative     Acceptable Yes    POCT Influenza A/B Molecular    Collection Time: 01/17/24 10:11 AM   Result Value Ref Range    POC Molecular Influenza A Ag Negative Negative, Not Reported    POC Molecular Influenza B Ag Negative Negative, Not Reported     Acceptable Yes      Assessment/Plan:         1. Class 1 obesity without serious comorbidity with body mass index (BMI) of 30.0 to 30.9 in adult, unspecified obesity type    2. BMI 30.0-30.9,adult    3. Bacterial infection    4. Acute cough      Class 1 obesity without serious comorbidity with body mass index (BMI) of 30.0 to 30.9 in adult, unspecified obesity type  Comments:  -recommend lifestyle modification    BMI 30.0-30.9,adult    Bacterial infection  Comments:  -anticipatory guidance, initiate med if sxs worsens due to immuno status due med condition.  Orders:  -     " azithromycin (Z-ALICIA) 250 MG tablet; Take 2 tablets by mouth on day 1; Take 1 tablet by mouth on days 2-5  Dispense: 6 tablet; Refill: 0    Acute cough  Comments:  for several day, dry cough, worsening and keeping him up at night, denies fever. see med  Orders:  -     promethazine-dextromethorphan (PROMETHAZINE-DM) 6.25-15 mg/5 mL Syrp; Take 5 mLs by mouth every 8 (eight) hours as needed (cough and congestion).  Dispense: 118 mL; Refill: 0                   I have reviewed all of the patient's clinical history available in care everywhere and Epic and have utilized this in my evaluation and management recommendations today.      Treatment options and alternatives were discussed with the patient. Patient was given ample time to ask questions. All questions were answered. Voices understanding and acceptance of this advice. Will call back if any further questions or concerns.   This note was generated with the assistance of ambient listening technology. Verbal consent was obtained by the patient and accompanying visitor(s) for the recording of patient appointment to facilitate this note. I attest to having reviewed and edited the generated note for accuracy, though some syntax or spelling errors may persist. Please contact the author of this note for any clarification.            Anne-Marie Rondon MD  [facility],

## 2025-04-02 DIAGNOSIS — R39.15 URGENCY OF URINATION: Primary | ICD-10-CM

## 2025-04-14 ENCOUNTER — OFFICE VISIT (OUTPATIENT)
Dept: PRIMARY CARE CLINIC | Facility: CLINIC | Age: 55
End: 2025-04-14
Payer: COMMERCIAL

## 2025-04-14 DIAGNOSIS — Z00.01 ENCOUNTER FOR GENERAL ADULT MEDICAL EXAMINATION WITH ABNORMAL FINDINGS: ICD-10-CM

## 2025-04-14 DIAGNOSIS — Z12.5 PROSTATE CANCER SCREENING: ICD-10-CM

## 2025-04-14 DIAGNOSIS — M10.472 OTHER SECONDARY ACUTE GOUT OF LEFT ANKLE: Primary | ICD-10-CM

## 2025-04-14 DIAGNOSIS — R52 ACUTE PAIN: ICD-10-CM

## 2025-04-14 PROCEDURE — 1160F RVW MEDS BY RX/DR IN RCRD: CPT | Mod: CPTII,S$GLB,, | Performed by: FAMILY MEDICINE

## 2025-04-14 PROCEDURE — 1159F MED LIST DOCD IN RCRD: CPT | Mod: CPTII,S$GLB,, | Performed by: FAMILY MEDICINE

## 2025-04-14 PROCEDURE — 99214 OFFICE O/P EST MOD 30 MIN: CPT | Mod: 25,S$GLB,, | Performed by: FAMILY MEDICINE

## 2025-04-14 PROCEDURE — 96372 THER/PROPH/DIAG INJ SC/IM: CPT | Mod: S$GLB,,, | Performed by: FAMILY MEDICINE

## 2025-04-14 PROCEDURE — 99999 PR PBB SHADOW E&M-EST. PATIENT-LVL II: CPT | Mod: PBBFAC,,, | Performed by: FAMILY MEDICINE

## 2025-04-14 RX ORDER — COLCHICINE 0.6 MG/1
0.6 TABLET ORAL 2 TIMES DAILY
Qty: 20 TABLET | Refills: 0 | Status: SHIPPED | OUTPATIENT
Start: 2025-04-14 | End: 2025-04-24

## 2025-04-14 RX ORDER — KETOROLAC TROMETHAMINE 15 MG/ML
60 INJECTION, SOLUTION INTRAMUSCULAR; INTRAVENOUS
Status: DISCONTINUED | OUTPATIENT
Start: 2025-04-14 | End: 2025-04-14

## 2025-04-14 RX ORDER — TRAMADOL HYDROCHLORIDE 50 MG/1
50 TABLET ORAL EVERY 8 HOURS PRN
Qty: 15 TABLET | Refills: 0 | Status: SHIPPED | OUTPATIENT
Start: 2025-04-14 | End: 2025-04-19

## 2025-04-14 RX ORDER — KETOROLAC TROMETHAMINE 30 MG/ML
60 INJECTION, SOLUTION INTRAMUSCULAR; INTRAVENOUS
Status: COMPLETED | OUTPATIENT
Start: 2025-04-14 | End: 2025-04-14

## 2025-04-14 RX ADMIN — KETOROLAC TROMETHAMINE 60 MG: 30 INJECTION, SOLUTION INTRAMUSCULAR; INTRAVENOUS at 02:04

## 2025-04-14 NOTE — PROGRESS NOTES
Verified patient by using 2 patient identifiers.  Administered _______KETOROLAC 60MG____  to ___LEFTVENTRAGLUTEAL_____  without difficulty.  Encouraged patient to remain in the lobby for 15 minutes post injection to monitor for adverse reactions, verbalized understanding.  Educated patient on signs of adverse reactions, and instructed patient to report any signs of adverse reactions to the  staff so that the clinical team is made aware, verbalized understanding

## 2025-04-14 NOTE — PROGRESS NOTES
Subjective:      Chief Complaint   Patient presents with    Follow-up     Left ankle pain      Patient ID: Chandra Stone is a 55 y.o. male.  History of Present Illness      Patient sitting in a wheelchair, not able to walk due to Left ankle pain due to acute gout, noted that he has not had blood work in a while, will get his annuals today.      Chandra Stone's allergies, medications, history, and problem list were updated as appropriate.  Past Medical History:   Diagnosis Date    HIV (human immunodeficiency virus infection)      Family History   Problem Relation Name Age of Onset    Hypertension Mother      Diabetes Mother      Diabetes Brother      Hypertension Brother       Social History[1]  Encounter Medications[2]       Objective:      There were no vitals taken for this visit.  Physical Exam  Musculoskeletal:      Right ankle: Tenderness (diffuse tenderness, sensitive to touch, swelling and warm to touch) present.        Legs:         Physical Exam             Results for orders placed or performed in visit on 01/17/24   POCT COVID-19 Rapid Screening    Collection Time: 01/17/24 10:11 AM   Result Value Ref Range    POC Rapid COVID Negative Negative     Acceptable Yes    POCT Influenza A/B Molecular    Collection Time: 01/17/24 10:11 AM   Result Value Ref Range    POC Molecular Influenza A Ag Negative Negative, Not Reported    POC Molecular Influenza B Ag Negative Negative, Not Reported     Acceptable Yes      Assessment/Plan:         1. Other secondary acute gout of left ankle    2. Encounter for general adult medical examination with abnormal findings    3. Prostate cancer screening    4. Acute pain      Other secondary acute gout of left ankle  Comments:  acute on chroni, sudden onset, known trigger-red meat consumption over the weekend, will need lab for renal function check and then take med as ordered.  Orders:  -     Discontinue: ketorolac injection 60 mg  -      colchicine (COLCRYS) 0.6 mg tablet; Take 1 tablet (0.6 mg total) by mouth 2 (two) times daily. for 10 days  Dispense: 20 tablet; Refill: 0  -     traMADoL (ULTRAM) 50 mg tablet; Take 1 tablet (50 mg total) by mouth every 8 (eight) hours as needed for Pain.  Dispense: 15 tablet; Refill: 0  -     ketorolac injection 60 mg    Encounter for general adult medical examination with abnormal findings  Comments:  Left ankle pain  Orders:  -     CBC Auto Differential; Future; Expected date: 04/14/2025  -     Comprehensive Metabolic Panel; Future; Expected date: 04/14/2025  -     Lipid Panel; Future; Expected date: 04/14/2025  -     TSH; Future; Expected date: 04/14/2025  -     Hemoglobin A1C; Future; Expected date: 04/14/2025    Prostate cancer screening  -     PSA, Screening; Future; Expected date: 04/14/2025    Acute pain  Comments:  acutye on chronic, see injection  Orders:  -     ketorolac injection 60 mg                   I have reviewed all of the patient's clinical history available in care everywhere and Epic and have utilized this in my evaluation and management recommendations today.      Treatment options and alternatives were discussed with the patient. Patient was given ample time to ask questions. All questions were answered. Voices understanding and acceptance of this advice. Will call back if any further questions or concerns.         This note was generated with the assistance of ambient listening technology. Verbal consent was obtained by the patient and accompanying visitor(s) for the recording of patient appointment to facilitate this note. I attest to having reviewed and edited the generated note for accuracy, though some syntax or spelling errors may persist. Please contact the author of this note for any clarification.            Anne-Marie Rondon MD  [facility], [place]           [1]   Social History  Socioeconomic History    Marital status: Single   Tobacco Use    Smoking status:  Former     Types: Cigarettes    Smokeless tobacco: Former    Tobacco comments:     quit over 5 years ago    Substance and Sexual Activity    Alcohol use: Yes     Comment: 3/week     Drug use: Never   [2]   Outpatient Encounter Medications as of 4/14/2025   Medication Sig Dispense Refill    aspirin (ECOTRIN) 81 MG EC tablet Take 1 tablet (81 mg total) by mouth once daily. 30 tablet 11    xtvuwwdfw-sbruowho-suulydx ala (BIKTARVY) -25 mg per tablet Take 1 tablet by mouth once daily.      clonazePAM (KLONOPIN) 1 MG tablet Take 1 tablet (1 mg total) by mouth 2 (two) times daily as needed for Anxiety. 30 tablet 0    colchicine (COLCRYS) 0.6 mg tablet Take 1 tablet (0.6 mg total) by mouth 2 (two) times daily. for 10 days 20 tablet 0    ibuprofen (ADVIL,MOTRIN) 800 MG tablet Take 1 tablet (800 mg total) by mouth every 8 (eight) hours as needed for Pain. 60 tablet 0    tamsulosin (FLOMAX) 0.4 mg Cap Take 1 capsule (0.4 mg total) by mouth once daily. for 7 days 7 capsule 0    traMADoL (ULTRAM) 50 mg tablet Take 1 tablet (50 mg total) by mouth every 8 (eight) hours as needed for Pain. 15 tablet 0    [DISCONTINUED] colchicine (COLCRYS) 0.6 mg tablet Take 1 tablet (0.6 mg total) by mouth 2 (two) times daily. for 10 days 20 tablet 0    [DISCONTINUED] traMADoL (ULTRAM) 50 mg tablet Take 1 tablet (50 mg), by mouth, every 6-8 hours, as needed for pain 28 tablet 0     Facility-Administered Encounter Medications as of 4/14/2025   Medication Dose Route Frequency Provider Last Rate Last Admin    [COMPLETED] ketorolac injection 60 mg  60 mg Intramuscular 1 time in Clinic/HOD    60 mg at 04/14/25 1435    [DISCONTINUED] ketorolac injection 60 mg  60 mg Intramuscular 1 time in Clinic/HOD

## 2025-05-05 ENCOUNTER — OFFICE VISIT (OUTPATIENT)
Dept: PRIMARY CARE CLINIC | Facility: CLINIC | Age: 55
End: 2025-05-05
Payer: COMMERCIAL

## 2025-05-05 DIAGNOSIS — R73.03 PREDIABETES: Primary | ICD-10-CM

## 2025-05-05 PROCEDURE — 99214 OFFICE O/P EST MOD 30 MIN: CPT | Mod: S$GLB,,, | Performed by: FAMILY MEDICINE

## 2025-05-05 PROCEDURE — 1159F MED LIST DOCD IN RCRD: CPT | Mod: CPTII,S$GLB,, | Performed by: FAMILY MEDICINE

## 2025-05-05 PROCEDURE — 99999 PR PBB SHADOW E&M-EST. PATIENT-LVL III: CPT | Mod: PBBFAC,,, | Performed by: FAMILY MEDICINE

## 2025-05-05 PROCEDURE — 1160F RVW MEDS BY RX/DR IN RCRD: CPT | Mod: CPTII,S$GLB,, | Performed by: FAMILY MEDICINE

## 2025-05-05 RX ORDER — METFORMIN HYDROCHLORIDE 500 MG/1
500 TABLET ORAL 2 TIMES DAILY WITH MEALS
Qty: 60 TABLET | Refills: 11 | Status: SHIPPED | OUTPATIENT
Start: 2025-05-05 | End: 2026-05-05

## 2025-05-05 RX ORDER — INSULIN PUMP SYRINGE, 3 ML
EACH MISCELLANEOUS
Qty: 1 EACH | Refills: 0 | Status: SHIPPED | OUTPATIENT
Start: 2025-05-05 | End: 2025-05-05 | Stop reason: ALTCHOICE

## 2025-05-05 RX ORDER — LANCETS
1 EACH MISCELLANEOUS
Qty: 100 EACH | Refills: 3 | Status: SHIPPED | OUTPATIENT
Start: 2025-05-05 | End: 2025-05-05 | Stop reason: ALTCHOICE

## 2025-05-05 NOTE — PATIENT INSTRUCTIONS
"Diabetes and Diet   The Basics   Written by the doctors and editors at [facility]   Why is diet important in diabetes? -- Diet is important because it is part of diabetes treatment. Many people need to change what they eat and how much they eat to help treat their diabetes. It is important for people to treat their diabetes so that they:  Keep their blood sugar at or near a normal level  Prevent long-term problems, such as heart or kidney problems, that can happen in people with diabetes  Changing your diet can also help treat obesity, high blood pressure, and high cholesterol. These conditions can affect people with diabetes and can lead to future problems, such as heart attacks or strokes.  Who will work with me to change my diet? -- Your doctor or nurse will work with you to make a food plan to change your diet. They might also recommend that you work with a "dietitian." A dietitian is an expert on food and eating.  Do I need to eat at the same times every day? -- When and how often you should eat depends, in part, on the diabetes medicines you take. For example:  People who take about the same amount of insulin at the same time each day (called a "fixed regimen") should eat meals at the same times. This is also true for people who take pills that increase insulin levels, such as sulfonylureas. Eating meals at the same time every day helps prevent low blood sugar.  People who adjust the dose and timing of their insulin each day (called a "flexible regimen") do not always have to eat meals at the same time. That's because they can time their insulin dose for before they plan to eat, and also adjust the dose for how much they plan to eat.  People who take medicines that don't usually cause low blood sugar, such as metformin, don't have to eat meals at the same time every day.  What do I need to think about when planning what to eat? -- Our bodies break down the food we eat into small pieces called carbohydrates, " "proteins, and fats.  When planning what to eat, people with diabetes need to think about:  Carbohydrates (or "carbs") - Carbohydrates, which are sugars that our bodies use for energy, can raise a person's blood sugar level. Your doctor, nurse, or dietitian will tell you how many carbohydrates you should eat at each meal or snack. Foods that have carbohydrates include:  Bread, pasta, and rice  Vegetables and fruits  Dairy foods  Foods and drinks with added sugar  It is best to get your carbohydrates from fruits, vegetables, whole grains, and low-fat milk. It is best to avoid drinks with added sugar, like soda, juices, and sports drinks.   Protein - Your doctor, nurse, or dietitian will tell you how much protein you should eat each day. It is best to eat lean meats, fish, eggs, beans, peas, soy products, nuts, and seeds.  Fats - The type of fat you eat is more important than the amount of fat. "Saturated" and "trans" fats can increase your risk for heart problems, like a heart attack.  Foods that have saturated fats include meat, butter, cheese, and ice cream.  Foods that have trans fats include processed food with "partially hydrogenated oils" on the ingredient list. This may include fried foods, store bought cookies, muffins, pies, and cakes.  "Monounsaturated" and "polyunsaturated" fats are better for you. Foods with these types of fat include fish, avocado, olive oil, and nuts.  Calories - People need to eat a certain amount of calories each day to keep their weight the same. People who are overweight and want to lose weight need to eat fewer calories each day.  Fiber - Eating foods with a lot of fiber can help control a person's blood sugar level. Foods that have a lot of fiber include apples, green beans, peas, beans, lentils, nuts, oatmeal, and whole grains.  Salt - People who have high blood pressure should not eat foods that contain a lot of salt (also called sodium). People with high blood pressure should " also eat healthy foods, such as fruits, vegetables, and low-fat dairy foods.  Alcohol - Having more than 1 drink (for women) or 2 drinks (for men) a day can raise blood sugar levels. Also, drinks that have fruit juice or soda in them can raise blood sugar levels.  What can I do if I need to lose weight? -- If you need to lose weight, you can:  Exercise - Try to get at least 30 minutes of physical activity a day, most days of the week. Even gentle exercise, like walking, is good for your health. Some people with diabetes need to change their medicine dose before they exercise. They might also need to check their blood sugar levels before and after exercising.  Eat fewer calories - Your doctor, nurse, or dietitian can tell you how many calories you should eat each day in order to lose weight.  If you are worried about your weight, size, or shape, talk with your doctor, nurse, or dietitian. They can help you make changes to improve your health.  Can I eat the same foods as my family? -- Yes. You do not need to eat special foods if you have diabetes. You and your family can eat the same foods. Changing your diet is mostly about eating healthy foods and not eating too much.  What are the other parts of diabetes treatment? -- Besides changing your diet, the other parts of diabetes treatment are:  Exercise  Medicines  Some people with diabetes need to learn how to match their diet and exercise with their medicine dose. For example, people who use insulin might need to choose the dose of insulin they give themselves. To choose their dose, they need to think about:  What they plan to eat at the next meal  How much exercise they plan to do  What their blood sugar level is  If the diet and exercise do not match the medicine dose, a person's blood sugar level can get too low or too high. Blood sugar levels that are too low or too high can cause problems.  All topics are updated as new evidence becomes available and our peer  review process is complete.  This topic retrieved from [employer] on: Sep 21, 2021.  Topic 31310 Version 7.0  Release: 29.4.2 - C29.263  © 2021 UpToDate, Inc. and/or its affiliates. All rights reserved.  Consumer Information Use and Disclaimer   This information is not specific medical advice and does not replace information you receive from your health care provider. This is only a brief summary of general information. It does NOT include all information about conditions, illnesses, injuries, tests, procedures, treatments, therapies, discharge instructions or life-style choices that may apply to you. You must talk with your health care provider for complete information about your health and treatment options. This information should not be used to decide whether or not to accept your health care provider's advice, instructions or recommendations. Only your health care provider has the knowledge and training to provide advice that is right for you. The use of this information is governed by the [employer] End User License Agreement, available at https://[email or web address]/[email or web address] use of [employer] content is governed by the [employer] Terms of Use. ©2021 UpToDate, Inc. All rights reserved.  Copyright

## 2025-05-05 NOTE — PROGRESS NOTES
Subjective:      Chief Complaint   Patient presents with    Diabetes Mellitus      Patient ID: Chandra Stone is a 55 y.o. male.  History of Present Illness            Chandra Stone's allergies, medications, history, and problem list were updated as appropriate.  Past Medical History:   Diagnosis Date    HIV (human immunodeficiency virus infection)      Family History   Problem Relation Name Age of Onset    Hypertension Mother      Diabetes Mother      Diabetes Brother      Hypertension Brother       Social History[1]  Encounter Medications[2]        A complete 10 point ROS was completed and are positive as per above HPI. Otherwise negative for fever, diplopia, chest pain, shortness of breath, vomiting, blood in urine, joint pain, skin rash, seizures and unusual bleeding.       Objective:      There were no vitals taken for this visit.  Physical Exam   Physical Exam           CONSTITUTIONAL: No apparent distress. Appears comfortable. Does not appear acutely ill or septic. Appears adequately hydrated.  CARDIOVASCULAR: No perioral cyanosis  PULMONARY: Breathing unlabored. No retractions Chest expansion grossly normal.  PSYCHIATRIC: Alert and conversant and grossly oriented. Mood is grossly neutral. Affect appropriate. Judgment and insight grossly intact.  NEUROLOGIC: No focal sensory deficits reported.   Results for orders placed or performed in visit on 01/17/24   POCT COVID-19 Rapid Screening    Collection Time: 01/17/24 10:11 AM   Result Value Ref Range    POC Rapid COVID Negative Negative     Acceptable Yes    POCT Influenza A/B Molecular    Collection Time: 01/17/24 10:11 AM   Result Value Ref Range    POC Molecular Influenza A Ag Negative Negative, Not Reported    POC Molecular Influenza B Ag Negative Negative, Not Reported     Acceptable Yes      Assessment/Plan:         1. Type 2 diabetes mellitus with hyperglycemia, without long-term current use of insulin      Type 2  diabetes mellitus with hyperglycemia, without long-term current use of insulin  Comments:  newly diagnosed with A1c of 6.9 in the infectious clinic, Pathology andf plan of care discussed and med initiated  Orders:  -     Ambulatory referral/consult to Diabetes Education; Future; Expected date: 05/12/2025  -     Diabetic Eye Screening Photo; Future  -     Microalbumin/Creatinine Ratio, Urine; Future; Expected date: 11/05/2025  -     empagliflozin (JARDIANCE) 25 mg tablet; Take 1 tablet (25 mg total) by mouth once daily.  Dispense: 30 tablet; Refill: 11  -     blood-glucose meter kit; Use as instructed  Dispense: 1 each; Refill: 0  -     blood sugar diagnostic Strp; 1 strip by Misc.(Non-Drug; Combo Route) route before breakfast.  Dispense: 100 each; Refill: 3  -     lancets Misc; 1 lancet  by Misc.(Non-Drug; Combo Route) route before breakfast.  Dispense: 100 each; Refill: 3                 Diabetes Meal Plan  Calories: 1400  Carbohydrate Per Meal: 30-45g  Carbohydrate Per Snack : 15-20g   I have reviewed all of the patient's clinical history available in care everywhere and Epic and have utilized this in my evaluation and management recommendations today.      Treatment options and alternatives were discussed with the patient. Patient was given ample time to ask questions. All questions were answered. Voices understanding and acceptance of this advice. Will call back if any further questions or concerns.         I spent a total of 30 minutes face to face and non-face to face on the date of this visit.This includes time preparing to see the patient (eg, review of tests, notes), obtaining and/or reviewing additional history from an independent historian and/or outside medical records, documenting clinical information in the electronic health record, independently interpreting results and/or communicating results to the patient/family/caregiver, or care coordinator.  Visit today included increased complexity associated  with the care of the episodic problem addressed and managing the longitudinal care of the patient due to the serious and/or complex managed problem(s).    Portions of the record may have been created with voice recognition software. Occasional wrong-word or sound-a-like substitutions may have occurred due to the inherent limitations of voice recognition software. Read the chart carefully and recognize, using context, where substitutions have occurred.      This note was generated with the assistance of ambient listening technology. Verbal consent was obtained by the patient and accompanying visitor(s) for the recording of patient appointment to facilitate this note. I attest to having reviewed and edited the generated note for accuracy, though some syntax or spelling errors may persist. Please contact the author of this note for any clarification.            Anne-Marie Rondon MD  [facility],            [1]   Social History  Socioeconomic History    Marital status: Single   Tobacco Use    Smoking status: Former     Types: Cigarettes    Smokeless tobacco: Former    Tobacco comments:     quit over 5 years ago    Substance and Sexual Activity    Alcohol use: Yes     Comment: 3/week     Drug use: Never   [2]   Outpatient Encounter Medications as of 5/5/2025   Medication Sig Dispense Refill    aspirin (ECOTRIN) 81 MG EC tablet Take 1 tablet (81 mg total) by mouth once daily. 30 tablet 11    pcxbdjiwm-dtvtxbdy-hfkstun ala (BIKTARVY) -25 mg per tablet Take 1 tablet by mouth once daily.      blood sugar diagnostic Strp 1 strip by Misc.(Non-Drug; Combo Route) route before breakfast. 100 each 3    blood-glucose meter kit Use as instructed 1 each 0    clonazePAM (KLONOPIN) 1 MG tablet Take 1 tablet (1 mg total) by mouth 2 (two) times daily as needed for Anxiety. 30 tablet 0    colchicine (COLCRYS) 0.6 mg tablet Take 1 tablet (0.6 mg total) by mouth 2 (two) times daily. for 10 days 20 tablet 0     empagliflozin (JARDIANCE) 25 mg tablet Take 1 tablet (25 mg total) by mouth once daily. 30 tablet 11    ibuprofen (ADVIL,MOTRIN) 800 MG tablet Take 1 tablet (800 mg total) by mouth every 8 (eight) hours as needed for Pain. 60 tablet 0    lancets Misc 1 lancet  by Misc.(Non-Drug; Combo Route) route before breakfast. 100 each 3    tamsulosin (FLOMAX) 0.4 mg Cap Take 1 capsule (0.4 mg total) by mouth once daily. for 7 days 7 capsule 0     No facility-administered encounter medications on file as of 5/5/2025.

## 2025-05-27 ENCOUNTER — OFFICE VISIT (OUTPATIENT)
Dept: PRIMARY CARE CLINIC | Facility: CLINIC | Age: 55
End: 2025-05-27
Payer: COMMERCIAL

## 2025-05-27 DIAGNOSIS — Z53.21 PROCEDURE AND TREATMENT NOT CARRIED OUT DUE TO PATIENT LEAVING PRIOR TO BEING SEEN BY HEALTH CARE PROVIDER: Primary | ICD-10-CM

## 2025-05-27 DIAGNOSIS — K05.219 GINGIVAL ABSCESS: Primary | ICD-10-CM

## 2025-05-27 PROCEDURE — 99999 PR PBB SHADOW E&M-EST. PATIENT-LVL II: CPT | Mod: PBBFAC,,, | Performed by: NURSE PRACTITIONER

## 2025-05-27 RX ORDER — KETOROLAC TROMETHAMINE 15 MG/ML
30 INJECTION, SOLUTION INTRAMUSCULAR; INTRAVENOUS ONCE
Status: COMPLETED | OUTPATIENT
Start: 2025-05-27 | End: 2025-05-27

## 2025-05-27 RX ADMIN — KETOROLAC TROMETHAMINE 30 MG: 15 INJECTION, SOLUTION INTRAMUSCULAR; INTRAVENOUS at 10:05

## 2025-05-27 NOTE — PROGRESS NOTES
Verified patient by using 2 patient identifiers.  Administered _____KETOROLAC 30MG______  to ____LEFT ARM____  without difficulty.  Encouraged patient to remain in the lobby for 15 minutes post injection to monitor for adverse reactions, verbalized understanding.  Educated patient on signs of adverse reactions, and instructed patient to report any signs of adverse reactions to the  staff so that the clinical team is made aware, verbalized understanding    Verified patient by using 2 patient identifiers.  Administered ______BICILLIN 2ML_____  to ____LEFT VENTRAGLUTEAL____  without difficulty.  Encouraged patient to remain in the lobby for 15 minutes post injection to monitor for adverse reactions, verbalized understanding.  Educated patient on signs of adverse reactions, and instructed patient to report any signs of adverse reactions to the  staff so that the clinical team is made aware, verbalized understanding

## 2025-05-28 DIAGNOSIS — K05.219 GINGIVAL ABSCESS: Primary | ICD-10-CM

## 2025-05-28 RX ORDER — AMOXICILLIN 875 MG/1
875 TABLET, COATED ORAL EVERY 12 HOURS
Qty: 20 TABLET | Refills: 0 | Status: SHIPPED | OUTPATIENT
Start: 2025-05-28 | End: 2025-06-07

## 2025-07-03 ENCOUNTER — PATIENT OUTREACH (OUTPATIENT)
Dept: ADMINISTRATIVE | Facility: HOSPITAL | Age: 55
End: 2025-07-03
Payer: COMMERCIAL

## 2025-08-28 DIAGNOSIS — M54.50 CHRONIC MIDLINE LOW BACK PAIN WITHOUT SCIATICA: Primary | ICD-10-CM

## 2025-08-28 DIAGNOSIS — G89.29 CHRONIC MIDLINE LOW BACK PAIN WITHOUT SCIATICA: Primary | ICD-10-CM

## 2025-08-28 DIAGNOSIS — M75.01 ADHESIVE CAPSULITIS OF RIGHT SHOULDER: Chronic | ICD-10-CM

## 2025-08-28 RX ORDER — IBUPROFEN 800 MG/1
800 TABLET, FILM COATED ORAL EVERY 12 HOURS PRN
Qty: 30 TABLET | Refills: 1 | Status: SHIPPED | OUTPATIENT
Start: 2025-08-28